# Patient Record
Sex: FEMALE | Race: WHITE | Employment: FULL TIME | URBAN - METROPOLITAN AREA
[De-identification: names, ages, dates, MRNs, and addresses within clinical notes are randomized per-mention and may not be internally consistent; named-entity substitution may affect disease eponyms.]

---

## 2017-08-28 ENCOUNTER — OFFICE VISIT (OUTPATIENT)
Dept: FAMILY MEDICINE CLINIC | Age: 32
End: 2017-08-28
Payer: COMMERCIAL

## 2017-08-28 VITALS
BODY MASS INDEX: 38.62 KG/M2 | WEIGHT: 254 LBS | DIASTOLIC BLOOD PRESSURE: 80 MMHG | HEART RATE: 84 BPM | SYSTOLIC BLOOD PRESSURE: 122 MMHG | RESPIRATION RATE: 16 BRPM | OXYGEN SATURATION: 97 %

## 2017-08-28 DIAGNOSIS — J02.9 SORE THROAT: ICD-10-CM

## 2017-08-28 DIAGNOSIS — Z00.00 ROUTINE ADULT HEALTH MAINTENANCE: ICD-10-CM

## 2017-08-28 DIAGNOSIS — R53.83 FATIGUE, UNSPECIFIED TYPE: Primary | ICD-10-CM

## 2017-08-28 PROCEDURE — 87880 STREP A ASSAY W/OPTIC: CPT | Performed by: FAMILY MEDICINE

## 2017-08-28 PROCEDURE — 99213 OFFICE O/P EST LOW 20 MIN: CPT | Performed by: FAMILY MEDICINE

## 2017-08-28 RX ORDER — EPINASTINE HCL 0.05 %
DROPS OPHTHALMIC (EYE)
COMMUNITY
Start: 2017-08-22 | End: 2017-08-28 | Stop reason: ALTCHOICE

## 2017-08-28 ASSESSMENT — ENCOUNTER SYMPTOMS
SINUS PRESSURE: 0
SORE THROAT: 1
COUGH: 1
SHORTNESS OF BREATH: 1
WHEEZING: 1
RHINORRHEA: 1
GASTROINTESTINAL NEGATIVE: 1

## 2017-08-29 LAB
ALBUMIN SERPL-MCNC: 4.2 G/DL
ALP BLD-CCNC: 70 U/L
ALT SERPL-CCNC: 13 U/L
ANION GAP SERPL CALCULATED.3IONS-SCNC: 9 MMOL/L
AST SERPL-CCNC: 17 U/L
BASOPHILS ABSOLUTE: 0 /ΜL
BASOPHILS RELATIVE PERCENT: 0.5 %
BILIRUB SERPL-MCNC: 0.5 MG/DL (ref 0.1–1.4)
BUN BLDV-MCNC: 11 MG/DL
CALCIUM SERPL-MCNC: 9.4 MG/DL
CHLORIDE BLD-SCNC: 105 MMOL/L
CHOLESTEROL, TOTAL: 204 MG/DL
CHOLESTEROL/HDL RATIO: 4.1
CO2: 24 MMOL/L
CREAT SERPL-MCNC: 0.96 MG/DL
EOSINOPHILS ABSOLUTE: 0.1 /ΜL
EOSINOPHILS RELATIVE PERCENT: 1.9 %
GFR CALCULATED: >60
GLUCOSE BLD-MCNC: 83 MG/DL
HCT VFR BLD CALC: 40.5 % (ref 36–46)
HDLC SERPL-MCNC: 50 MG/DL (ref 35–70)
HEMOGLOBIN: 13.7 G/DL (ref 12–16)
LDL CHOLESTEROL CALCULATED: 142 MG/DL (ref 0–160)
LYMPHOCYTES ABSOLUTE: 2.3 /ΜL
LYMPHOCYTES RELATIVE PERCENT: 29.4 %
MCH RBC QN AUTO: 30.1 PG
MCHC RBC AUTO-ENTMCNC: 33.8 G/DL
MCV RBC AUTO: 89 FL
MONOCYTES ABSOLUTE: 0.5 /ΜL
MONOCYTES RELATIVE PERCENT: 6.8 %
NEUTROPHILS ABSOLUTE: 4.8 /ΜL
NEUTROPHILS RELATIVE PERCENT: 61.4 %
PDW BLD-RTO: 13.6 %
PLATELET # BLD: 212 K/ΜL
PMV BLD AUTO: 9.9 FL
POTASSIUM SERPL-SCNC: 3.6 MMOL/L
RBC # BLD: 4.55 10^6/ΜL
S PYO AG THROAT QL: NORMAL
SODIUM BLD-SCNC: 138 MMOL/L
TOTAL PROTEIN: 7.1
TRIGL SERPL-MCNC: 59 MG/DL
TSH SERPL DL<=0.05 MIU/L-ACNC: 2.07 UIU/ML
VLDLC SERPL CALC-MCNC: 12 MG/DL
WBC # BLD: 7.9 10^3/ML

## 2017-08-30 DIAGNOSIS — Z00.00 ROUTINE ADULT HEALTH MAINTENANCE: ICD-10-CM

## 2017-08-30 DIAGNOSIS — R53.83 FATIGUE, UNSPECIFIED TYPE: ICD-10-CM

## 2017-08-31 DIAGNOSIS — R53.83 FATIGUE, UNSPECIFIED TYPE: ICD-10-CM

## 2017-08-31 DIAGNOSIS — J02.9 SORE THROAT: ICD-10-CM

## 2017-11-17 ENCOUNTER — HOSPITAL ENCOUNTER (OUTPATIENT)
Age: 32
Setting detail: SPECIMEN
Discharge: HOME | End: 2017-11-17
Payer: COMMERCIAL

## 2017-11-17 ENCOUNTER — OFFICE VISIT (OUTPATIENT)
Dept: FAMILY MEDICINE CLINIC | Age: 32
End: 2017-11-17
Payer: COMMERCIAL

## 2017-11-17 VITALS
BODY MASS INDEX: 38.92 KG/M2 | WEIGHT: 256 LBS | SYSTOLIC BLOOD PRESSURE: 120 MMHG | OXYGEN SATURATION: 98 % | RESPIRATION RATE: 16 BRPM | HEART RATE: 125 BPM | DIASTOLIC BLOOD PRESSURE: 80 MMHG

## 2017-11-17 DIAGNOSIS — N39.0 URINARY TRACT INFECTION WITHOUT HEMATURIA, SITE UNSPECIFIED: Primary | ICD-10-CM

## 2017-11-17 DIAGNOSIS — N30.01 ACUTE CYSTITIS WITH HEMATURIA: ICD-10-CM

## 2017-11-17 LAB
BILIRUBIN, POC: ABNORMAL
BLOOD URINE, POC: ABNORMAL
CLARITY, POC: ABNORMAL
COLOR, POC: ABNORMAL
GLUCOSE URINE, POC: ABNORMAL
KETONES, POC: 15
LEUKOCYTE EST, POC: ABNORMAL
NITRITE, POC: ABNORMAL
PH, POC: 6
PROTEIN, POC: 30
SPECIFIC GRAVITY, POC: 1.01
UROBILINOGEN, POC: 0.2

## 2017-11-17 PROCEDURE — 99213 OFFICE O/P EST LOW 20 MIN: CPT | Performed by: FAMILY MEDICINE

## 2017-11-17 PROCEDURE — 81002 URINALYSIS NONAUTO W/O SCOPE: CPT | Performed by: FAMILY MEDICINE

## 2017-11-17 RX ORDER — TOPIRAMATE 100 MG/1
CAPSULE, EXTENDED RELEASE ORAL
Refills: 0 | COMMUNITY
Start: 2017-10-25 | End: 2019-09-04 | Stop reason: SDUPTHER

## 2017-11-17 RX ORDER — CIPROFLOXACIN 250 MG/1
250 TABLET, FILM COATED ORAL 2 TIMES DAILY
Qty: 14 TABLET | Refills: 0 | Status: SHIPPED | OUTPATIENT
Start: 2017-11-17 | End: 2017-11-24

## 2017-11-17 ASSESSMENT — PATIENT HEALTH QUESTIONNAIRE - PHQ9
2. FEELING DOWN, DEPRESSED OR HOPELESS: 0
SUM OF ALL RESPONSES TO PHQ9 QUESTIONS 1 & 2: 0
SUM OF ALL RESPONSES TO PHQ QUESTIONS 1-9: 0
1. LITTLE INTEREST OR PLEASURE IN DOING THINGS: 0

## 2017-11-17 ASSESSMENT — ENCOUNTER SYMPTOMS: GASTROINTESTINAL NEGATIVE: 1

## 2017-11-17 NOTE — PROGRESS NOTES
Subjective:      Patient ID: Carmen Mojica is a 27 y.o. female. HPI  Dysuria,  Cramping,   For 2 days,  And frequency,  No fever,     Review of Systems   Gastrointestinal: Negative. Genitourinary: Positive for dysuria and frequency. Negative for menstrual problem (lmp today). Objective:   Physical Exam   Constitutional: She is oriented to person, place, and time. She appears well-developed and well-nourished. No distress. Abdominal: Soft. Bowel sounds are normal. She exhibits no distension. There is no tenderness. There is no rebound and no guarding. No cva tenderness   Musculoskeletal: She exhibits no edema. Neurological: She is alert and oriented to person, place, and time. Skin: Skin is warm and dry. No rash noted. She is not diaphoretic. No erythema. Psychiatric: She has a normal mood and affect.  Her behavior is normal. Thought content normal.   urine infected    Assessment:      uti      Plan:      See orders  See prn

## 2017-11-19 LAB
CULTURE: ABNORMAL
CULTURE: ABNORMAL
Lab: ABNORMAL
ORGANISM: ABNORMAL
SPECIMEN DESCRIPTION: ABNORMAL
STATUS: ABNORMAL

## 2018-08-02 ENCOUNTER — HOSPITAL ENCOUNTER (OUTPATIENT)
Age: 33
Discharge: HOME | End: 2018-08-04
Payer: COMMERCIAL

## 2018-08-02 ENCOUNTER — OFFICE VISIT (OUTPATIENT)
Dept: FAMILY MEDICINE CLINIC | Age: 33
End: 2018-08-02
Payer: COMMERCIAL

## 2018-08-02 ENCOUNTER — HOSPITAL ENCOUNTER (OUTPATIENT)
Dept: GENERAL RADIOLOGY | Age: 33
Discharge: HOME | End: 2018-08-04
Payer: COMMERCIAL

## 2018-08-02 VITALS
WEIGHT: 251 LBS | OXYGEN SATURATION: 98 % | HEART RATE: 90 BPM | BODY MASS INDEX: 38.04 KG/M2 | RESPIRATION RATE: 16 BRPM | HEIGHT: 68 IN | SYSTOLIC BLOOD PRESSURE: 124 MMHG | DIASTOLIC BLOOD PRESSURE: 80 MMHG

## 2018-08-02 DIAGNOSIS — M25.511 RIGHT SHOULDER PAIN, UNSPECIFIED CHRONICITY: ICD-10-CM

## 2018-08-02 DIAGNOSIS — M75.101 ROTATOR CUFF SYNDROME, RIGHT: Primary | ICD-10-CM

## 2018-08-02 DIAGNOSIS — M75.101 ROTATOR CUFF SYNDROME, RIGHT: ICD-10-CM

## 2018-08-02 PROCEDURE — 99213 OFFICE O/P EST LOW 20 MIN: CPT | Performed by: FAMILY MEDICINE

## 2018-08-02 PROCEDURE — 73030 X-RAY EXAM OF SHOULDER: CPT

## 2018-08-02 NOTE — PROGRESS NOTES
Subjective:      Patient ID: Ravinder Paige is a 27 y.o. female. HPI     Presents today for pain in the right shoulder    Onset was several months ago. There was no inciting injury. The pain continues to get worse. She has tried some icy hot and OTC pain relievers but nothing helps much. She works for [employer] and does repetitive motions of her hands. Review of Systems   Except as noted in HPI, ROS negative    Objective:   Physical Exam   Constitutional: She appears well-developed and well-nourished. No distress. Musculoskeletal: She exhibits tenderness (anterior right shoulder joint). Positive empty can test. Pain with internal rotation of the shoulder   Vitals reviewed. Assessment:      1. Rotator cuff syndrome, right    2. Right shoulder pain, unspecified chronicity          Plan:      Xray, shoulder exercises x 3-4 weeks.    If no improvement plan for MRI

## 2018-08-02 NOTE — PROGRESS NOTES
Visit Information    Have you changed or started any medications since your last visit including any over-the-counter medicines, vitamins, or herbal medicines? no   Are you having any side effects from any of your medications? -  no  Have you stopped taking any of your medications? Is so, why? -  no    Have you seen any other physician or provider since your last visit? Yes - Records Obtained  Have you had any other diagnostic tests since your last visit? No  Have you been seen in the emergency room and/or had an admission to a hospital since we last saw you? No  Have you had your routine dental cleaning in the past 6 months? yes -     Have you activated your [employer] account? If not, what are your barriers?  Yes     Patient Care Team:  Sage Wilson DO as PCP - Aby Aparicio MD as Consulting Physician (Neurology)    Medical History Review  Past Medical, Family, and Social History reviewed and does not contribute to the patient presenting condition    Health Maintenance   Topic Date Due    HIV screen  09/06/1998    DTaP/Tdap/Td vaccine (1 - Tdap) 09/06/2002    Cervical cancer screen  09/06/2004    Flu vaccine (1) 09/01/2018

## 2018-08-30 ENCOUNTER — TELEPHONE (OUTPATIENT)
Dept: FAMILY MEDICINE CLINIC | Age: 33
End: 2018-08-30

## 2018-08-30 DIAGNOSIS — M75.101 ROTATOR CUFF SYNDROME OF RIGHT SHOULDER: Primary | ICD-10-CM

## 2018-08-30 NOTE — TELEPHONE ENCOUNTER
Patient called c/o shoulder pain. She stated she was seen recently for this issue and Dr Jorge Cabrera gave her some exercises to do. These exercises are not working and she was told to contact the office again. Please advise patient what her next step of action should be.

## 2018-08-31 NOTE — TELEPHONE ENCOUNTER
I would suggest ibuprofen 600mg Q6hrs over the weekend and notify Dr. Trish Barajas early next week of progress.

## 2018-09-20 ENCOUNTER — OFFICE VISIT (OUTPATIENT)
Dept: ORTHOPEDIC SURGERY | Age: 33
End: 2018-09-20
Payer: COMMERCIAL

## 2018-09-20 VITALS
SYSTOLIC BLOOD PRESSURE: 130 MMHG | BODY MASS INDEX: 37.89 KG/M2 | HEIGHT: 68 IN | HEART RATE: 81 BPM | WEIGHT: 250 LBS | DIASTOLIC BLOOD PRESSURE: 91 MMHG

## 2018-09-20 DIAGNOSIS — M75.21 RIGHT BICIPITAL TENOSYNOVITIS: Primary | ICD-10-CM

## 2018-09-20 PROCEDURE — 99203 OFFICE O/P NEW LOW 30 MIN: CPT | Performed by: ORTHOPAEDIC SURGERY

## 2018-09-20 RX ORDER — DICLOFENAC SODIUM 75 MG/1
75 TABLET, DELAYED RELEASE ORAL 2 TIMES DAILY WITH MEALS
Qty: 28 TABLET | Refills: 0 | Status: SHIPPED | OUTPATIENT
Start: 2018-09-20 | End: 2019-09-04 | Stop reason: ALTCHOICE

## 2018-09-20 NOTE — PROGRESS NOTES
Orthopedic Shoulder Encounter Note     Chief complaint: Right shoulder pain    Trauma:  No; Date of Injury: Not applicable    HPI: Riri Zapien is a 26 y.o. old right-hand dominant female who presents for evaluation of right shoulder pain. She states that it began about a year ago. There was not a specific injury associated. It is gone worse over time. She describes her pain as being present typically with activity but occasionally can be present with or without activity. Pain is primarily localized to the superior aspect of the shoulder but radiates distally across the lateral deltoid. She has significant pain reaching backward especially to look her bra and she states that late flatbed and attempting to lift that arm causes pain as well. She denies having any associated stiffness, weakness, or radicular symptoms including numbness/tingling. Initially she was seen by her Marlin Eisenbergs" who put her through physical therapy at work without any relief. Previous treatment:    NSAIDs: Ibuprofen    Physical Therapy:  Yes    Injections: None    Surgeries: None    Review of Systems:     Constitution: no fever or chills   Pain level: 7/10  Musculoskeletal: As noted in the HPI   Neurologic: no neurologic symptoms    Past Medical History  Deisi  has no past medical history on file. Past Surgical History  Deisi  has no past surgical history on file. Current Medications  Current Outpatient Prescriptions   Medication Sig Dispense Refill    diclofenac (VOLTAREN) 75 MG EC tablet Take 1 tablet by mouth 2 times daily (with meals) for 14 days 28 tablet 0    TROKENDI  MG CP24   0     No current facility-administered medications for this visit. Allergies  Allergies have been reviewed. Deisi has No Known Allergies. Social History  Deisi  reports that she has never smoked. She has never used smokeless tobacco. She reports that she drinks alcohol. She reports that she does not use drugs.     Family History  Deisi's family history is not on file.      Physical Exam:     BP (!) 130/91   Pulse 81   Ht 5' 8\" (1.727 m)   Wt 250 lb (113.4 kg)   BMI 38.01 kg/m²    General Appearance: alert, well appearing, and in no distress  Mental Status: alert, oriented to person, place, and time  Gait: normal    Shoulder:    Skin: warm and dry, no rash or erythema; no swelling or obvious muscular atrophy  Vasculature: 2+ radial pulses bilaterally  Neuro: Sensation grossly intact to light touch diffusely  Tenderness: Tender to palpation over the biceps tendon in the bicipital groove of the right shoulder    ROM: (Degrees)    Right   A P   Left   A P    Elevation  150 160   Elevation  155   Abduction  170 170   Abduction  170    ER   80 85   ER   80   IR   T12    IR   T12   90 abd/ER      90 abd/ER     90 abd/IR      90 abd/IR     Crepitation  No    Crepitation No  Dyskenesia  No    Dyskenesia No      Muscle strength:    Right       Left    Deltoid   5    Deltoid   5  Supraspinatus  5    Supraspinatus  5  ER   5    ER   5  IR   5    IR   5    Special tests    Right   Rotator Cuff    Left    n   Painful arc    n   n   Pain with ER    n    n   Neer's     n    n   Hawkin's    n    n   Drop Arm    n  n   Lift off/Belly Press   n  n   ER Lag    n          AC Joint  n   AC tenderness   n  n   Cross-chest adduction  n       Labrum/biceps    y (equivocal)  Henderson's    n   n   Biceps sheer    n      n   Speed's/Yergason's   n    y   Tenderness Biceps Groove  n    n   Matty's    n         Instability  n   Ant Apprehension   n    n   Post Apprehension   n    n   Ant Load shift    n    n   Post Load shift   n   n   Sulcus     n  n   Generalized Laxity   n  n   Relocation test   n  n   Crank test     n  n   Adilson-superior escape  n       Imaging:  Xrays: 3 views of the right shoulder obtained on 8/2/18 were independently reviewed  Indications: Right shoulder pain  Findings: Normal glenohumeral and acromioclavicular joint spaces with type I acromion. No obvious fracture, dislocation, or subluxation. Impression: Normal right shoulder radiograph    Impression/Plan:     Angel Huerta is a 26 y.o. old female with right shoulder pain appears to be due to rotator cuff tendinitis versus biceps tenosynovitis. Due to the chronicity of her symptoms and the fact that she has undergone some therapy and use of NSAIDs without any pain relief I recommend proceeding at this time with an MR arthrogram of the shoulder. We will facilitate her getting this study completed and I'll have her follow-up afterwards to review and discuss the results. In the meantime she was placed on a 2 week course of Voltaren 75 mg to be taken twice a day with meals.         NA = Not assessed  RTC = Rotator cuff  RCT = Rotator cuff tear  ER = External rotation  IR = Internal rotation  AC = Acromioclavicular  GH = Glenohumeral  n = No  y = Yes

## 2018-09-20 NOTE — LETTER
9/20/2018    DO Debby Ariasrika [address]  [address], [facility]. [address] [place] [id number]    RE: Ronnell Llamas    Dear Dr. Jorge Cabrera,    Thank you for allowing me to participate in the care of Ms. Mora. I had the opportunity to evaluate the patient on 9/20/2018. Attached you will find my evaluation and recommendations. Thanks again for the confidence you have expressed in me by allowing my participation in the care of your patient. I will keep you apprised of further developments in the patients treatment course as it progresses. If I can be of further assistance in any fashion, please feel free to contact me at your convenience.     Sincerely,        Jasper Cox  Shoulder and Elbow Surgery

## 2018-09-21 DIAGNOSIS — G89.29 CHRONIC RIGHT SHOULDER PAIN: Primary | ICD-10-CM

## 2018-09-21 DIAGNOSIS — M25.511 CHRONIC RIGHT SHOULDER PAIN: Primary | ICD-10-CM

## 2018-11-28 ENCOUNTER — TELEPHONE (OUTPATIENT)
Dept: FAMILY MEDICINE CLINIC | Age: 33
End: 2018-11-28

## 2018-11-28 RX ORDER — AZITHROMYCIN 250 MG/1
TABLET, FILM COATED ORAL
Qty: 1 PACKET | Refills: 0 | Status: SHIPPED | OUTPATIENT
Start: 2018-11-28 | End: 2018-12-08

## 2019-02-19 ENCOUNTER — TELEPHONE (OUTPATIENT)
Dept: FAMILY MEDICINE CLINIC | Age: 34
End: 2019-02-19

## 2019-09-04 ENCOUNTER — OFFICE VISIT (OUTPATIENT)
Dept: NEUROLOGY | Age: 34
End: 2019-09-04
Payer: COMMERCIAL

## 2019-09-04 VITALS
HEIGHT: 68 IN | WEIGHT: 277.4 LBS | BODY MASS INDEX: 42.04 KG/M2 | HEART RATE: 80 BPM | DIASTOLIC BLOOD PRESSURE: 91 MMHG | SYSTOLIC BLOOD PRESSURE: 127 MMHG

## 2019-09-04 DIAGNOSIS — G43.711 CHRONIC MIGRAINE WITHOUT AURA, WITH INTRACTABLE MIGRAINE, SO STATED, WITH STATUS MIGRAINOSUS: Primary | ICD-10-CM

## 2019-09-04 PROCEDURE — 99204 OFFICE O/P NEW MOD 45 MIN: CPT | Performed by: PSYCHIATRY & NEUROLOGY

## 2019-09-04 RX ORDER — TOPIRAMATE 100 MG/1
100 CAPSULE, EXTENDED RELEASE ORAL NIGHTLY
Qty: 90 CAPSULE | Refills: 2 | Status: SHIPPED | OUTPATIENT
Start: 2019-09-04 | End: 2020-06-16

## 2019-09-04 NOTE — PROGRESS NOTES
involuntary movements or tremors     Reflex function Intact 2+ DTR and symmetric. Negative Babinski     Gait                  Normal station and gait       Lab Results   Component Value Date    LDLCALC 142 08/29/2017     No components found for: CHLPL  Lab Results   Component Value Date    TRIG 59 08/29/2017     Lab Results   Component Value Date    HDL 50 08/29/2017     Lab Results   Component Value Date    LDLCALC 142 08/29/2017     No results found for: LABVLDL  No results found for: LABA1C  No results found for: EAG  No results found for: DEUBEAHO71   Neurological work up:  CT head  CTA head and neck  MRI brain   2 D echo     All of patient's labs were personally reviewed. All the imaging studies were personally reviewed and discussed with the patient. Assessment Recommendations:  Chronic migraine without aura, intractable, with status migrainosus: Previously patient has responded well to Trokendi  mg p.o. daily. I will reinitiate the patient is a medication. Medication side effects were discussed and questions were answered. Right upper extremity tremor: Previously the tremors have responded to topiramate. Upon readmission topiramate, I anticipate the tremors improved. If need be, the dose can further be increased as she tolerates    Spasmodic torticollis: Patient previously had been getting Botox injections. She will be scheduled to get repeat injections in the next 2 weeks pending prior authorization from insurance. Follow-up with me in next 2 weeks for Botox injection. Tangela Santana, DO I would like to thank you for the consult. Please do not hesitate if you have any questions about the patient care.      Sindy Mckay MD  Neurology       This note is created with the assistance of a speech-recognition program. While intending to generate a document that actually reflects the content of the visit, the document can still have some errors including those of syntax and sound a- like

## 2019-09-11 ENCOUNTER — HOSPITAL ENCOUNTER (OUTPATIENT)
Dept: MRI IMAGING | Age: 34
Discharge: HOME | End: 2019-09-13
Payer: COMMERCIAL

## 2019-09-11 DIAGNOSIS — G43.711 CHRONIC MIGRAINE WITHOUT AURA, WITH INTRACTABLE MIGRAINE, SO STATED, WITH STATUS MIGRAINOSUS: ICD-10-CM

## 2019-09-11 PROCEDURE — 6360000004 HC RX CONTRAST MEDICATION: Performed by: PSYCHIATRY & NEUROLOGY

## 2019-09-11 PROCEDURE — 70553 MRI BRAIN STEM W/O & W/DYE: CPT

## 2019-09-11 PROCEDURE — A9579 GAD-BASE MR CONTRAST NOS,1ML: HCPCS | Performed by: PSYCHIATRY & NEUROLOGY

## 2019-09-11 RX ADMIN — GADOTERIDOL 20 ML: 279.3 INJECTION, SOLUTION INTRAVENOUS at 10:35

## 2019-10-01 ENCOUNTER — TELEPHONE (OUTPATIENT)
Dept: NEUROLOGY | Age: 34
End: 2019-10-01

## 2020-06-16 RX ORDER — TOPIRAMATE 100 MG/1
CAPSULE, EXTENDED RELEASE ORAL
Qty: 90 CAPSULE | Refills: 0 | Status: SHIPPED | OUTPATIENT
Start: 2020-06-16 | End: 2020-11-12 | Stop reason: SDUPTHER

## 2020-06-16 NOTE — TELEPHONE ENCOUNTER
Pharmacy requesting refill of Trokendi.       Medication active on med list yes      Date of last fill: 9/4/19  verified on 6/16/20 verified by [facility] [employer] LPN      Date of last appointment 9/4/19    Next Visit Date:  Visit date not found

## 2020-09-08 ENCOUNTER — OFFICE VISIT (OUTPATIENT)
Dept: FAMILY MEDICINE CLINIC | Age: 35
End: 2020-09-08
Payer: COMMERCIAL

## 2020-09-08 ENCOUNTER — HOSPITAL ENCOUNTER (OUTPATIENT)
Age: 35
Setting detail: SPECIMEN
Discharge: HOME | End: 2020-09-08
Payer: COMMERCIAL

## 2020-09-08 VITALS
WEIGHT: 265 LBS | SYSTOLIC BLOOD PRESSURE: 110 MMHG | HEART RATE: 89 BPM | DIASTOLIC BLOOD PRESSURE: 70 MMHG | RESPIRATION RATE: 18 BRPM | OXYGEN SATURATION: 98 % | HEIGHT: 68 IN | TEMPERATURE: 97.8 F | BODY MASS INDEX: 40.16 KG/M2

## 2020-09-08 PROBLEM — N13.30 HYDRONEPHROSIS: Status: ACTIVE | Noted: 2019-01-23

## 2020-09-08 PROBLEM — N13.5 URETERAL STRICTURE, LEFT: Status: ACTIVE | Noted: 2019-04-24

## 2020-09-08 LAB
ABSOLUTE EOS #: 0.18 K/UL (ref 0–0.44)
ABSOLUTE IMMATURE GRANULOCYTE: 0.03 K/UL (ref 0–0.3)
ABSOLUTE LYMPH #: 2.26 K/UL (ref 1.1–3.7)
ABSOLUTE MONO #: 0.49 K/UL (ref 0.1–1.2)
ALBUMIN SERPL-MCNC: 4.2 G/DL (ref 3.5–5.2)
ALT SERPL-CCNC: 21 U/L (ref 5–33)
ANION GAP SERPL CALCULATED.3IONS-SCNC: 16 MMOL/L (ref 9–17)
AST SERPL-CCNC: 24 U/L
BASOPHILS # BLD: 0 % (ref 0–2)
BASOPHILS ABSOLUTE: 0.03 K/UL (ref 0–0.2)
BUN BLDV-MCNC: 8 MG/DL (ref 6–20)
BUN/CREAT BLD: NORMAL (ref 9–20)
CALCIUM SERPL-MCNC: 9.6 MG/DL (ref 8.6–10.4)
CHLORIDE BLD-SCNC: 103 MMOL/L (ref 98–107)
CHOLESTEROL/HDL RATIO: 3.8
CHOLESTEROL: 204 MG/DL
CO2: 20 MMOL/L (ref 20–31)
CREAT SERPL-MCNC: 0.77 MG/DL (ref 0.5–0.9)
DIFFERENTIAL TYPE: NORMAL
EOSINOPHILS RELATIVE PERCENT: 2 % (ref 1–4)
GFR AFRICAN AMERICAN: >60 ML/MIN
GFR NON-AFRICAN AMERICAN: >60 ML/MIN
GFR SERPL CREATININE-BSD FRML MDRD: NORMAL ML/MIN/{1.73_M2}
GFR SERPL CREATININE-BSD FRML MDRD: NORMAL ML/MIN/{1.73_M2}
GLUCOSE BLD-MCNC: 78 MG/DL (ref 70–99)
HCT VFR BLD CALC: 43.4 % (ref 36.3–47.1)
HDLC SERPL-MCNC: 54 MG/DL
HEMOGLOBIN: 13.7 G/DL (ref 11.9–15.1)
HIV AG/AB: NONREACTIVE
IMMATURE GRANULOCYTES: 0 %
LDL CHOLESTEROL: 133 MG/DL (ref 0–130)
LYMPHOCYTES # BLD: 28 % (ref 24–43)
MAGNESIUM: 2.4 MG/DL (ref 1.6–2.6)
MCH RBC QN AUTO: 29.3 PG (ref 25.2–33.5)
MCHC RBC AUTO-ENTMCNC: 31.6 G/DL (ref 28.4–34.8)
MCV RBC AUTO: 92.7 FL (ref 82.6–102.9)
MONOCYTES # BLD: 6 % (ref 3–12)
NRBC AUTOMATED: 0 PER 100 WBC
PDW BLD-RTO: 13.2 % (ref 11.8–14.4)
PHOSPHORUS: 3.6 MG/DL (ref 2.6–4.5)
PLATELET # BLD: 277 K/UL (ref 138–453)
PLATELET ESTIMATE: NORMAL
PMV BLD AUTO: 11.8 FL (ref 8.1–13.5)
POTASSIUM SERPL-SCNC: 4 MMOL/L (ref 3.7–5.3)
RBC # BLD: 4.68 M/UL (ref 3.95–5.11)
RBC # BLD: NORMAL 10*6/UL
SEG NEUTROPHILS: 64 % (ref 36–65)
SEGMENTED NEUTROPHILS ABSOLUTE COUNT: 5.19 K/UL (ref 1.5–8.1)
SODIUM BLD-SCNC: 139 MMOL/L (ref 135–144)
TRIGL SERPL-MCNC: 83 MG/DL
TSH SERPL DL<=0.05 MIU/L-ACNC: 3.22 MIU/L (ref 0.3–5)
VLDLC SERPL CALC-MCNC: ABNORMAL MG/DL (ref 1–30)
WBC # BLD: 8.2 K/UL (ref 3.5–11.3)
WBC # BLD: NORMAL 10*3/UL

## 2020-09-08 PROCEDURE — 90471 IMMUNIZATION ADMIN: CPT | Performed by: FAMILY MEDICINE

## 2020-09-08 PROCEDURE — 90715 TDAP VACCINE 7 YRS/> IM: CPT | Performed by: FAMILY MEDICINE

## 2020-09-08 PROCEDURE — G8417 CALC BMI ABV UP PARAM F/U: HCPCS | Performed by: FAMILY MEDICINE

## 2020-09-08 PROCEDURE — 99395 PREV VISIT EST AGE 18-39: CPT | Performed by: FAMILY MEDICINE

## 2020-09-08 ASSESSMENT — PATIENT HEALTH QUESTIONNAIRE - PHQ9
2. FEELING DOWN, DEPRESSED OR HOPELESS: 1
SUM OF ALL RESPONSES TO PHQ QUESTIONS 1-9: 2
1. LITTLE INTEREST OR PLEASURE IN DOING THINGS: 1
SUM OF ALL RESPONSES TO PHQ9 QUESTIONS 1 & 2: 2
SUM OF ALL RESPONSES TO PHQ QUESTIONS 1-9: 2

## 2020-09-08 NOTE — PATIENT INSTRUCTIONS
Learning About Obesity  What is obesity? Obesity means having a body mass index (BMI) of 30 or above. BMI is a number that is calculated from your weight and your height. How do you know if your weight is in the obesity range? To know if your weight is in the obesity range, your doctor looks at your body mass index (BMI) and waist size. BMI is a number that is calculated from your weight and your height. To figure out your BMI for yourself, you can use an online tool, such as http://www.olson.com/ on the [employer] of [employer]. If your BMI is 30.0 or higher, it falls within the obesity range. Keep in mind that BMI and waist size are only guides. They are not tools to determine your ideal body weight. What causes obesity? When you take in more calories than you burn off, you gain weight. How you eat, how active you are, and other things affect how your body uses calories and whether you gain weight. If you have family members who have too much body fat, you may have inherited a tendency to gain weight. And your family also helps form your eating and lifestyle habits, which can lead to obesity. Also, our busy lives make it harder to plan and cook healthy meals. For many of us, it's easier to reach for prepared foods, go out to eat, or go to the drive-through. But these foods are often high in saturated fat and calories. Portions are often too large. What can you do to reach a healthy weight? Focus on health, not diets. Diets are hard to stay on and don't work in the long run. It is very hard to stay with a diet that includes lots of big changes in your eating habits. Instead of a diet, focus on lifestyle changes that will improve your health and achieve the right balance of energy and calories. To lose weight, you need to burn more calories than you take in.  You can do it by eating healthy foods in reasonable amounts and becoming more active, even a little bit every day. Making small changes over time can add up to a lot. Make a plan for change. Many people have found that naming their reasons for change and staying focused on their plan can make a big difference. Work with your doctor to create a plan that is right for you. · Ask yourself: Keena Torres are my personal, most powerful reasons for wanting this change? What will my life look like when I've made the change? \"  · Set your long-term goal. Make it specific, such as \"I will lose x pounds. \"  · Break your long-term goal into smaller, short-term goals. Make these small steps specific and within your reach, things you know you can do. These steps are what keep you going from day to day. Talk with your doctor about other weight-loss options. If you have a BMI in a certain range and have not been able to lose weight with diet and exercise, medicine or surgery may be an option for you. Before your doctor will prescribe medicines or surgery, he or she will probably want you to be more active and follow your healthy eating plan for a period of time. These habits are key lifelong changes for managing your weight, with or without other medical treatment. And these changes can help you avoid weight-related health problems. How can you stay on your plan for change? Be ready. Choose to start during a time when there are few events that might trigger slip-ups, like holidays, social events, and high-stress periods. Decide on your first few steps. Most people have more success when they make small changes, one step at a time. For example, you might switch a daily candy bar to a piece of fruit, walk 10 minutes more, or add more vegetables to a meal.  Line up your support people. Make sure you're not going to be alone as you make this change. Connect with people who understand how important it is to you. Ask family members and friends for help in keeping with your plan.  And think about who could make it harder for you, and how to handle them. Try tracking. People who keep track of what they eat, feel, and do are better at losing weight. Try writing down things like:  · What and how much you eat. · How you feel before and after each meal.  · Details about each meal (like eating out or at home, eating alone, or with friends or family). · What you do to be active. Look and plan. As you track, look for patterns that you may want to change. Take note of:  · When you eat and whether you skip meals. · How often you eat out. · How many fruits and vegetables you eat. · When you eat beyond feeling full. · When and why you eat for reasons other than being hungry. When you stray from your plan, don't get upset. Figure out what made you slip up and how you can fix it. Can you take medicines or have surgery to lose weight? If you have a BMI in a certain range and have not been able to lose weight with diet and exercise, medicine or surgery may be an option for you. If you have a BMI of at least 30.0 (or a BMI of at least 27.0 and another health problem related to your weight), ask your doctor about weight-loss medicines. They work by making you feel less hungry, making you feel full more quickly, or changing how you digest fat. Medicines are used along with diet changes and more physical activity to help you make lasting changes. If you have a BMI of 40.0 or more (or a BMI of 35.0 or more and another health problem related to your weight), your doctor may talk with you about surgery. Weight-loss surgery has risks, and you will need to work with your doctor to compare the risk of having obesity with the risks of surgery. With any option you choose, you will still need to eat a healthy diet and get regular exercise. Follow-up care is a key part of your treatment and safety. Be sure to make and go to all appointments, and call your doctor if you are having problems.  It's also a good idea to know your test results and keep a list of the medicines you take. Where can you learn more? Go to https://chpepiceweb.[employer]. org and sign in to your [employer] account. Enter N111 in the [employer] box to learn more about \"Learning About Obesity. \"     If you do not have an account, please click on the \"Sign Up Now\" link. Current as of: December 11, 2019               Content Version: 12.5  © 4094-9228 [employer]wise, [employer]. Care instructions adapted under license by [facility] ([facility]). If you have questions about a medical condition or this instruction, always ask your healthcare professional. [address] any warranty or liability for your use of this information.

## 2020-09-08 NOTE — PROGRESS NOTES
Progress Note    Deisi Mora is a 38 y.o.  female who presents today alone for evaluation of   Chief Complaint   Patient presents with    New Patient           HPI:   Patient is her to [facility] today. Patient PMH TBI and intention tremor. Patient [address] eye surgery, CTS release, inguinal hernia repair, and left ureter stent. Patient fhx MGM DM type 2. Patient is a never smoker. Patient admits to occasional EtOH beverage. Patient denies illicits. Patient denies SIG E CAPS. Patient denies SI/HI. Patient denies anxiety. Patient denies specific diet. Patient denies regular aerobic activity. Patient follows with GYN. Patient denies prior abnormal PAP. Patient states she believes her last PAP was last year. PHQ-9 Total Score: 2 (9/8/2020 10:50 AM)    Patient reports migraines since TBI. Patient denies aura. Patient reports the migraine over her bilateral forehead and behind her eye bilaterally. Patient states she has associated n/v. Patient last MRI was 2019 and did not reveal any acute abnormality. Patient states she has had her flu shot already. Patient would like Tdap today. Health Maintenance Due   Topic Date Due    Varicella vaccine (1 of 2 - 2-dose childhood series) 09/06/1984    HIV screen  09/06/1998    DTaP/Tdap/Td vaccine (1 - Tdap) 09/06/2002    Cervical cancer screen  09/06/2004    Flu vaccine (1) 09/01/2020        Current Medications:     Current Outpatient Medications   Medication Sig Dispense Refill    TROKENDI  MG CP24 take 1 capsule by mouth at bedtime (Patient not taking: Reported on 9/8/2020) 90 capsule 0     No current facility-administered medications for this visit.         Allergies:   No Known Allergies     Medical History:     Past Medical History:   Diagnosis Date    Intention tremor     TBI (traumatic brain injury) ([facility] [address]        Past Surgical History:   Procedure Laterality Date    CARPAL TUNNEL RELEASE      EYE SURGERY      INGUINAL HERNIA REPAIR      URETER STENT PLACEMENT         Family History   Problem Relation Age of Onset    Diabetes type 2  Maternal Grandmother         Social History:     Social History     Socioeconomic History    Marital status:      Spouse name: Not on file    Number of children: Not on file    Years of education: Not on file    Highest education level: Not on file   Occupational History    Not on file   Social Needs    Financial resource strain: Not on file    Food insecurity     Worry: Not on file     Inability: Not on file    Transportation needs     Medical: Not on file     Non-medical: Not on file   Tobacco Use    Smoking status: Never Smoker    Smokeless tobacco: Never Used   Substance and Sexual Activity    Alcohol use: Yes    Drug use: No    Sexual activity: Not on file   Lifestyle    Physical activity     Days per week: Not on file     Minutes per session: Not on file    Stress: Not on file   Relationships    Social connections     Talks on phone: Not on file     Gets together: Not on file     Attends Uatsdin service: Not on file     Active member of club or organization: Not on file     Attends meetings of clubs or organizations: Not on file     Relationship status: Not on file    Intimate partner violence     Fear of current or ex partner: Not on file     Emotionally abused: Not on file     Physically abused: Not on file     Forced sexual activity: Not on file   Other Topics Concern    Not on file   Social History Narrative    Not on file        ROS:     Constitutional: No fevers, chills, fatigue. ENT: No nasal congestion or sore throat  Respiratory: No difficulty in breathing or cough. Cardiovascular: No chest pain, palpitations or shortness of breath  Gastrointestinal: No abdominal pain or change in bowel movements. Genitourinary: No change in urinary frequency or dysuria. Skin: No rashes or skin lesions. Neurological: No weakness. No headaches.          Last Filed Vitals:  /70   Pulse 89 Temp 97.8 °F (36.6 °C) (Temporal)   Resp 18   Ht 5' 8\" (1.727 m)   Wt 265 lb (120.2 kg)   SpO2 98%   BMI 40.29 kg/m²      Physical Examination:     GENERAL APPEARANCE: in no acute distress, well developed, well nourished. HEAD: normocephalic, atraumatic. EYES: extraocular movement intact (EOMI), pupils equal, round, reactive to light and accommodation. EARS: normal, tympanic membrane intact, clear, auditory canal clear. NOSE: nares patent, no erythema, sinuses nontender bilaterally, no rhinorrhea. ORAL CAVITY: mucosa moist, no lesions. THROAT: clear, no mass, no exudate. NECK/THYROID: neck supple, full range of motion, no thyromegaly. HEART: no murmurs, regular rate and rhythm, S1, S2 normal.   LUNGS: clear to auscultation bilaterally, no wheezes, rales, rhonchi. ABDOMEN: normal, bowel sounds present, soft, nontender, nondistended, no rebound guarding or rigidity    Recent Labs/ In Office Testing/ Radiograph review:     Hospital Outpatient Visit on 11/17/2017   Component Date Value Ref Range Status    Specimen Description 11/17/2017 . CLEAN CATCH URINE   Final    Special Requests 11/17/2017 NOT REPORTED   Final    Culture 11/17/2017 ESCHERICHIA COLI 50 to 100,000 CFU/ML*  Final    Culture 11/17/2017 [facility] [address] [place] [address], [address] [place] [address] [phone]   Final    Status 11/17/2017 FINAL 11/19/2017   Final    Organism 11/17/2017 EC   Final       No results found for this visit on 09/08/20. Assessment/Plan:     [facility] was seen today for new patient. Diagnoses and all orders for this visit:    Well adult exam    BMI 40.0-44.9, adult [facility])    Exercise counseling    Dietary counseling  -      BMI ABOVE NORMAL F/U    Multiphasic screening  -     HIV Screen; Future    Metabolic syndrome  -     ALT; Future  -     AST; Future  -     CBC Auto Differential; Future  -     Hemoglobin A1C; Future  -     Lipid Panel; Future  -     Magnesium;  Future  -     Renal Function Panel; Future  -     TSH with Reflex; Future    Migraine without aura and without status migrainosus, not intractable  -     Denisha Kelsey MD, Neurology, [facility]    Need for vaccination  -     Tdap (age 6y and older) IM ([address])    Follow up on labs. Encouraged well balanced diet. Encouraged 150 mins of aerobic activity weekly. Tdap today. Referral for neurology. She has had a neurologist since her TBI. She states neurology manages her migraines. She has failed inderal and topamax in the past. Asked her to discuss Aimovig with her neurologist. She states she had received her influenza vaccination already. All questions answered and addressed to patient satisfaction. Patient understands and agrees to the plan. The patient was evaluated and treated today based on the osteopathic principle that each person is a unit of body, mind, and spirit, the body is capable of self-regulation, self-healing, and health maintenance and that structure and function are reciprocally interrelated. Follow-up:   Return in about 1 year (around 9/8/2021) for CPE; 40 min appt. Gill Cabrera D.O.    BMI was elevated today, and weight loss plan recommended is : conventional weight loss.

## 2020-09-09 LAB
ESTIMATED AVERAGE GLUCOSE: 111 MG/DL
HBA1C MFR BLD: 5.5 % (ref 4–6)

## 2020-10-02 ENCOUNTER — TELEPHONE (OUTPATIENT)
Dept: NEUROLOGY | Age: 35
End: 2020-10-02

## 2020-10-02 NOTE — TELEPHONE ENCOUNTER
Prior auth for Trokendi XR done through [employer]. It was approved. I called [facility] and let them know that it was approved. I spoke with Tracey Dumont. I also called Deisi and let her know that it was approved.

## 2020-10-21 ENCOUNTER — OFFICE VISIT (OUTPATIENT)
Dept: NEUROLOGY | Age: 35
End: 2020-10-21
Payer: COMMERCIAL

## 2020-10-21 VITALS
TEMPERATURE: 97.4 F | DIASTOLIC BLOOD PRESSURE: 86 MMHG | WEIGHT: 269 LBS | HEART RATE: 74 BPM | SYSTOLIC BLOOD PRESSURE: 124 MMHG | HEIGHT: 68 IN | BODY MASS INDEX: 40.77 KG/M2

## 2020-10-21 PROCEDURE — 99214 OFFICE O/P EST MOD 30 MIN: CPT | Performed by: PSYCHIATRY & NEUROLOGY

## 2020-10-21 RX ORDER — TOPIRAMATE 25 MG/1
25 CAPSULE, EXTENDED RELEASE ORAL DAILY
Qty: 30 CAPSULE | Refills: 2 | Status: SHIPPED | OUTPATIENT
Start: 2020-10-21 | End: 2022-06-07
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
## 2020-11-12 RX ORDER — TOPIRAMATE 100 MG/1
CAPSULE, EXTENDED RELEASE ORAL
Qty: 30 CAPSULE | Refills: 2 | Status: SHIPPED | OUTPATIENT
Start: 2020-11-12 | End: 2021-09-20

## 2020-11-12 RX ORDER — TOPIRAMATE 100 MG/1
CAPSULE, EXTENDED RELEASE ORAL
Qty: 90 CAPSULE | Refills: 0 | OUTPATIENT
Start: 2020-11-12

## 2021-01-26 ENCOUNTER — OFFICE VISIT (OUTPATIENT)
Dept: NEUROLOGY | Age: 36
End: 2021-01-26
Payer: COMMERCIAL

## 2021-01-26 VITALS
HEIGHT: 68 IN | TEMPERATURE: 99.1 F | DIASTOLIC BLOOD PRESSURE: 81 MMHG | WEIGHT: 256 LBS | HEART RATE: 83 BPM | SYSTOLIC BLOOD PRESSURE: 126 MMHG | BODY MASS INDEX: 38.8 KG/M2

## 2021-01-26 DIAGNOSIS — R53.83 FATIGUE, UNSPECIFIED TYPE: Primary | ICD-10-CM

## 2021-01-26 PROCEDURE — 99213 OFFICE O/P EST LOW 20 MIN: CPT | Performed by: PSYCHIATRY & NEUROLOGY
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
## 2021-02-15 ENCOUNTER — HOSPITAL ENCOUNTER (OUTPATIENT)
Dept: LAB | Age: 36
Setting detail: SPECIMEN
Discharge: HOME | End: 2021-02-15
Payer: COMMERCIAL

## 2021-02-15 DIAGNOSIS — Z20.822 COVID-19 RULED OUT BY LABORATORY TESTING: Primary | ICD-10-CM

## 2021-02-15 PROCEDURE — U0003 INFECTIOUS AGENT DETECTION BY NUCLEIC ACID (DNA OR RNA); SEVERE ACUTE RESPIRATORY SYNDROME CORONAVIRUS 2 (SARS-COV-2) (CORONAVIRUS DISEASE [COVID-19]), AMPLIFIED PROBE TECHNIQUE, MAKING USE OF HIGH THROUGHPUT TECHNOLOGIES AS DESCRIBED BY CMS-2020-01-R: HCPCS

## 2021-02-15 PROCEDURE — U0005 INFEC AGEN DETEC AMPLI PROBE: HCPCS

## 2021-02-16 LAB
SARS-COV-2: NORMAL
SARS-COV-2: NOT DETECTED
SOURCE: NORMAL

## 2021-02-17 ENCOUNTER — TELEPHONE (OUTPATIENT)
Dept: PRIMARY CARE CLINIC | Age: 36
End: 2021-02-17

## 2021-03-04 ENCOUNTER — TELEPHONE (OUTPATIENT)
Dept: NEUROLOGY | Age: 36
End: 2021-03-04

## 2021-03-04 DIAGNOSIS — R51.9 HEADACHE DISORDER: ICD-10-CM

## 2021-03-04 DIAGNOSIS — R53.83 FATIGUE, UNSPECIFIED TYPE: Primary | ICD-10-CM

## 2021-03-04 NOTE — TELEPHONE ENCOUNTER
Ang Caldera from [place] [address] lab on [address] called the office this morning. She stated that the patient's insurance has denied the inlab sleep study and is asking for a new order for a home sleep study. Order will be placed.

## 2021-03-16 ENCOUNTER — HOSPITAL ENCOUNTER (OUTPATIENT)
Dept: SLEEP CENTER | Age: 36
Discharge: HOME | End: 2021-03-18
Payer: COMMERCIAL

## 2021-03-16 DIAGNOSIS — R51.9 HEADACHE DISORDER: ICD-10-CM

## 2021-03-16 DIAGNOSIS — R53.83 FATIGUE, UNSPECIFIED TYPE: ICD-10-CM

## 2021-03-16 PROCEDURE — G0399 HOME SLEEP TEST/TYPE 3 PORTA: HCPCS

## 2021-03-25 LAB — STATUS: NORMAL

## 2021-03-25 PROCEDURE — 95806 SLEEP STUDY UNATT&RESP EFFT: CPT | Performed by: PSYCHIATRY & NEUROLOGY

## 2021-06-02 ENCOUNTER — OFFICE VISIT (OUTPATIENT)
Dept: NEUROLOGY | Age: 36
End: 2021-06-02
Payer: COMMERCIAL

## 2021-06-02 VITALS
SYSTOLIC BLOOD PRESSURE: 122 MMHG | HEIGHT: 68 IN | DIASTOLIC BLOOD PRESSURE: 74 MMHG | BODY MASS INDEX: 37.74 KG/M2 | WEIGHT: 249 LBS

## 2021-06-02 DIAGNOSIS — R51.9 HEADACHE DISORDER: Primary | ICD-10-CM

## 2021-06-02 PROCEDURE — 99214 OFFICE O/P EST MOD 30 MIN: CPT | Performed by: PSYCHIATRY & NEUROLOGY

## 2021-06-02 RX ORDER — AMITRIPTYLINE HYDROCHLORIDE 25 MG/1
25 TABLET, FILM COATED ORAL NIGHTLY
Qty: 30 TABLET | Refills: 1 | Status: SHIPPED | OUTPATIENT
Start: 2021-06-02 | End: 2022-07-21
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
## 2021-07-29 ENCOUNTER — TELEMEDICINE (OUTPATIENT)
Dept: NEUROLOGY | Age: 36
End: 2021-07-29
Payer: COMMERCIAL

## 2021-07-29 DIAGNOSIS — R51.9 HEADACHE DISORDER: Primary | ICD-10-CM

## 2021-07-29 PROCEDURE — 99214 OFFICE O/P EST MOD 30 MIN: CPT | Performed by: PSYCHIATRY & NEUROLOGY
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
## 2021-09-08 ENCOUNTER — TELEPHONE (OUTPATIENT)
Dept: NEUROLOGY | Age: 36
End: 2021-09-08

## 2021-09-08 NOTE — TELEPHONE ENCOUNTER
[employer] [facility]) - [id number]  Trokendi XR 100MG er capsules       Status: PA Response - Approved    Created: September 3rd, 2021    Sent: September 8th, 2021    Open  Archive    Sent patient my chart message of approval.  Sent to [facility]

## 2021-09-08 NOTE — TELEPHONE ENCOUNTER
Started in [employer]     Pending approval    [employer] Key: [id number] - PA Case ID: [id number]  Call us at [phone]  Status  Sent to [facility]  Drug  Trokendi XR 100MG er capsules  Form

## 2021-09-17 NOTE — TELEPHONE ENCOUNTER
Pharmacy requesting a  refill of Trokendi xr 100mg.       Medication active on med list yes      Date of last prescription 11/12/2020  with 2 refills verified on 09/17/2021    verified by TAO THORNTON      Date of last appointment 07/29/2021    Next Visit Date:  12/29/2021 No

## 2021-09-20 RX ORDER — TOPIRAMATE 100 MG/1
CAPSULE, EXTENDED RELEASE ORAL
Qty: 90 CAPSULE | Refills: 2 | Status: SHIPPED | OUTPATIENT
Start: 2021-09-20 | End: 2022-06-29

## 2021-12-29 ENCOUNTER — OFFICE VISIT (OUTPATIENT)
Dept: NEUROLOGY | Age: 36
End: 2021-12-29
Payer: COMMERCIAL

## 2021-12-29 VITALS
BODY MASS INDEX: 36.83 KG/M2 | HEIGHT: 68 IN | HEART RATE: 87 BPM | DIASTOLIC BLOOD PRESSURE: 85 MMHG | TEMPERATURE: 97.3 F | SYSTOLIC BLOOD PRESSURE: 120 MMHG | WEIGHT: 243 LBS

## 2021-12-29 DIAGNOSIS — R51.9 HEADACHE DISORDER: Primary | ICD-10-CM

## 2021-12-29 PROCEDURE — 99213 OFFICE O/P EST LOW 20 MIN: CPT | Performed by: PSYCHIATRY & NEUROLOGY
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
## 2022-06-07 ENCOUNTER — OFFICE VISIT (OUTPATIENT)
Dept: FAMILY MEDICINE CLINIC | Age: 37
End: 2022-06-07
Payer: COMMERCIAL

## 2022-06-07 VITALS
DIASTOLIC BLOOD PRESSURE: 88 MMHG | OXYGEN SATURATION: 98 % | HEART RATE: 88 BPM | WEIGHT: 234 LBS | SYSTOLIC BLOOD PRESSURE: 136 MMHG | BODY MASS INDEX: 35.58 KG/M2

## 2022-06-07 DIAGNOSIS — Z71.82 EXERCISE COUNSELING: ICD-10-CM

## 2022-06-07 DIAGNOSIS — Z71.3 DIETARY COUNSELING: ICD-10-CM

## 2022-06-07 DIAGNOSIS — Z13.228 SCREENING FOR METABOLIC DISORDER: ICD-10-CM

## 2022-06-07 DIAGNOSIS — F51.01 PRIMARY INSOMNIA: ICD-10-CM

## 2022-06-07 DIAGNOSIS — Z00.00 WELL ADULT EXAM: Primary | ICD-10-CM

## 2022-06-07 PROCEDURE — G8417 CALC BMI ABV UP PARAM F/U: HCPCS | Performed by: FAMILY MEDICINE

## 2022-06-07 PROCEDURE — 99395 PREV VISIT EST AGE 18-39: CPT | Performed by: FAMILY MEDICINE

## 2022-06-07 RX ORDER — TRAZODONE HYDROCHLORIDE 50 MG/1
TABLET ORAL
Qty: 60 TABLET | Refills: 1 | Status: SHIPPED | OUTPATIENT
Start: 2022-06-07 | End: 2022-07-21 | Stop reason: SDUPTHER

## 2022-06-07 SDOH — ECONOMIC STABILITY: FOOD INSECURITY: WITHIN THE PAST 12 MONTHS, YOU WORRIED THAT YOUR FOOD WOULD RUN OUT BEFORE YOU GOT MONEY TO BUY MORE.: NEVER TRUE

## 2022-06-07 SDOH — ECONOMIC STABILITY: FOOD INSECURITY: WITHIN THE PAST 12 MONTHS, THE FOOD YOU BOUGHT JUST DIDN'T LAST AND YOU DIDN'T HAVE MONEY TO GET MORE.: NEVER TRUE

## 2022-06-07 ASSESSMENT — SOCIAL DETERMINANTS OF HEALTH (SDOH): HOW HARD IS IT FOR YOU TO PAY FOR THE VERY BASICS LIKE FOOD, HOUSING, MEDICAL CARE, AND HEATING?: NOT HARD AT ALL

## 2022-06-07 ASSESSMENT — PATIENT HEALTH QUESTIONNAIRE - PHQ9
1. LITTLE INTEREST OR PLEASURE IN DOING THINGS: 0
SUM OF ALL RESPONSES TO PHQ QUESTIONS 1-9: 0
2. FEELING DOWN, DEPRESSED OR HOPELESS: 0
SUM OF ALL RESPONSES TO PHQ QUESTIONS 1-9: 0
SUM OF ALL RESPONSES TO PHQ QUESTIONS 1-9: 0
SUM OF ALL RESPONSES TO PHQ9 QUESTIONS 1 & 2: 0
SUM OF ALL RESPONSES TO PHQ QUESTIONS 1-9: 0

## 2022-06-07 NOTE — PATIENT INSTRUCTIONS
Learning About Obesity  What is obesity? Obesity means having an unhealthy amount of body fat. This puts your health in danger. It can lead to other health problems, such as type 2 diabetes and highblood pressure. How do you know if your weight is in the obesity range? To know if your weight is in the obesity range, your doctor looks at your bodymass index (BMI) and waist size. BMI is a number that is calculated from your weight and your height. To figure out your BMI for yourself, you can use an online tool, such as http://www.Applyful/ on Sqwiggle. If your BMI is 30.0 or higher, it falls within the obesity range. Keep in mind that BMI and waist size are only guides. They are not tools to determine yourideal body weight. What causes obesity? When you take in more calories than you burn off, you gain weight. How you eat, how active you are, and other things affect how your body uses calories andwhether you gain weight. If you have family members who have too much body fat, you may have inherited a tendency to gain weight. And your family also helps form your eating andlifestyle habits, which can lead to obesity. Also, our busy lives make it harder to plan and cook healthy meals. For many of us, it's easier to reach for prepared foods, go out to eat, or go to the drive-through. But these foods are often high in saturated fat and calories. Portions are often too large. What can you do to reach a healthy weight? Focus on health, not diets. Diets are hard to stay on and don't work in the long run. It is very hard tostay with a diet that includes lots of big changes in your eating habits. Instead of a diet, focus on lifestyle changes that will improve your health and achieve the right balance of energy and calories. To lose weight, you need to burn more calories than you take in.  You can do it by eating healthy foods in reasonable amounts and becoming more active, even a little bit every day. Making small changes over time can add up to a lot. Make a plan for change. Many people have found that naming their reasons for change and staying focused on their plan can make a big difference. Work with your doctor tocreate a plan that is right for you.  Ask yourself: Ronn [place] are my personal, most powerful reasons for wanting this change? What will my life look like when I've made the change? \"   Set your long-term goal. Make it specific, such as \"I will lose x pounds. \"  Reginia Dunks your long-term goal into smaller, short-term goals. Make these small steps specific and within your reach, things you know you can do. These steps are what keep you going from day to day. Talk with your doctor about other weight-loss options. If you have a BMI in a certain range and have not been able to lose weight with diet and exercise, medicine or surgery may be an option for you. Before your doctor will prescribe medicines or surgery, he or she will probably want you to be more active and follow your healthy eating plan for a period of time. These habits are key lifelong changes for managing your weight, with or without other medical treatment. And these changes can help you avoid weight-relatedhealth problems. How can you stay on your plan for change? Be ready. Choose to start during a time when there are few events like holidays, socialevents, and high-stress periods. These events might trigger slip-ups. Decide on your first few steps. Most people have more success when they make small changes, one step at a time. For example, you might switch a daily candy bar to a piece of fruit, walk10 minutes more, or add more vegetables to a meal.  Line up your support people. Make sure you're not going to be alone as you make this change. Connect with people who understand how important it is to you.  Ask family members and friends for help in keeping with your plan. And think about who could make itharder for you, and how to handle them. Try tracking. People who keep track of what they eat, feel, and do are better at losingweight. Try writing down things like:   What and how much you eat.  How you feel before and after each meal.   Details about each meal (like eating out or at home, eating alone, or with friends or family).  What you do to be active. Look and plan. As you track, look for patterns that you may want to change. Take note of:   When you eat and whether you skip meals.  How often you eat out.  How many fruits and vegetables you eat.  When you eat beyond feeling full.  When and why you eat for reasons other than being hungry. When you stray from your plan, don't get upset. Figure out what made you slipup and how you can fix it. Can you take medicines or have surgery to lose weight? If you have a BMI in a certain range and have not been able to lose weight withdiet and exercise, medicine or surgery may be an option for you. If you have a BMI of at least 30.0 (or a BMI of at least 27.0 and another health problem related to your weight), ask your doctor about weight-loss medicines. They work by making you feel less hungry, making you feel full more quickly, or changing how you digest fat. Medicines are used along with dietchanges and more physical activity to help you make lasting changes. If you have a BMI of 40.0 or more (or a BMI of 35.0 or more and another health problem related to your weight), your doctor may talk with you about surgery. Weight-loss surgery has risks, and you will need to work with your doctor tocompare the risk of having obesity with the risks of surgery. With any option you choose, you will still need to eat a healthy diet and getregular exercise. Follow-up care is a key part of your treatment and safety. Be sure to make and go to all appointments, and call your doctor if you are having problems.  It's also a good idea to know your test results and keep alist of the medicines you take. Where can you learn more? Go to [email or web address] org and sign in to your My True Fit account. Enter N111 in the [facility] box to learn more about \"Learning About Obesity. \"     If you do not have an account, please click on the \"Sign Up Now\" link. Current as of: December 27, 2021               Content Version: 13.2  © 4743-8563 Healthwise, Incorporated. Care instructions adapted under license by [facility] ([facility]). If you have questions about a medical condition or this instruction, always ask your healthcare professional. [address] any warranty or liability for your use of this information.

## 2022-06-07 NOTE — PROGRESS NOTES
Progress Note    Deisi Mora is a 43 y.o.  female who presents today alone for evaluation of   Chief Complaint   Patient presents with    Insomnia     started around a month ago     Annual Exam           HPI:   Patient is here for CPE today. Patient PMH TBI and intention tremor. Patient [address] eye surgery, CTS release, inguinal hernia repair, and left ureter stent. Patient fhx MGM DM type 2. Patient is a never smoker. Patient admits to occasional EtOH beverage. Patient denies illicits. Patient denies SIG E CAPS. Patient denies SI/HI. Patient denies anxiety. Patient denies specific diet. Patient denies regular aerobic activity. Patient follows with GYN. Patient denies prior abnormal PAP. Patient states she believes her last PAP was this year. PHQ-9 Total Score: 0 (6/7/2022 10:46 AM)      Patient is here for primary insomnia today. Patient states she does have difficulty falling and staying asleep. Patient states benadryl and melatonin have not helped. Patient denies anxiety/depression. Health Maintenance Due   Topic Date Due    Varicella vaccine (1 of 2 - 2-dose childhood series) Never done    Hepatitis C screen  Never done    Cervical cancer screen  Never done    Depression Screen  09/08/2021        Current Medications:     Current Outpatient Medications   Medication Sig Dispense Refill    traZODone (DESYREL) 50 MG tablet Take 1-2 tablets one hour prior to bedtime. 60 tablet 1    TROKENDI  MG CP24 take 1 capsule by mouth at bedtime 90 capsule 2    amitriptyline (ELAVIL) 25 MG tablet Take 1 tablet by mouth nightly 30 tablet 1     No current facility-administered medications for this visit.        Allergies:   No Known Allergies     Medical History:     Past Medical History:   Diagnosis Date    Intention tremor     TBI (traumatic brain injury) ([facility] [address]        Past Surgical History:   Procedure Laterality Date    CARPAL TUNNEL RELEASE      EYE SURGERY      INGUINAL HERNIA REPAIR      URETER STENT PLACEMENT         Family History   Problem Relation Age of Onset    Diabetes type 2  Maternal Grandmother         Social History:     Social History     Socioeconomic History    Marital status:      Spouse name: Not on file    Number of children: Not on file    Years of education: Not on file    Highest education level: Not on file   Occupational History    Not on file   Tobacco Use    Smoking status: Never Smoker    Smokeless tobacco: Never Used   Vaping Use    Vaping Use: Never used   Substance and Sexual Activity    Alcohol use: Yes     Alcohol/week: 1.0 standard drink     Types: 1 Cans of beer per week    Drug use: No    Sexual activity: Not on file   Other Topics Concern    Not on file   Social History Narrative    Not on file     Social Determinants of Health     Financial Resource Strain: Low Risk     Difficulty of Paying Living Expenses: Not hard at all   Food Insecurity: No Food Insecurity    Worried About [address] in the Last Year: Never true    [facility] [address] in the Last Year: Never true   Transportation Needs:     Lack of Transportation (Medical): Not on file    Lack of Transportation (Non-Medical):  Not on file   Physical Activity:     Days of Exercise per Week: Not on file    Minutes of Exercise per Session: Not on file   Stress:     Feeling of Stress : Not on file   Social Connections:     Frequency of Communication with Friends and Family: Not on file    Frequency of Social Gatherings with Friends and Family: Not on file    Attends Mormon Services: Not on file    Active Member of Clubs or Organizations: Not on file    Attends Club or Organization Meetings: Not on file    Marital Status: Not on file   Intimate Partner Violence:     Fear of Current or Ex-Partner: Not on file    Emotionally Abused: Not on file    Physically Abused: Not on file    Sexually Abused: Not on file   Housing Stability:     Unable to Pay for Housing in the Last Year: Not on file    Number of Places Lived in the Last Year: Not on file    Unstable Housing in the Last Year: Not on file        ROS:     Constitutional: No fevers, chills, fatigue. ENT: No nasal congestion or sore throat  Respiratory: No difficulty in breathing or cough. Cardiovascular: No chest pain, palpitations or shortness of breath  Gastrointestinal: No abdominal pain or change in bowel movements. Genitourinary: No change in urinary frequency or dysuria. Skin: No rashes or skin lesions. Neurological: No weakness. No headaches. Last Filed Vitals:  /88   Pulse 88   Wt 234 lb (106.1 kg)   SpO2 98%   BMI 35.58 kg/m²      Physical Examination:     GENERAL APPEARANCE: in no acute distress, well developed, well nourished. HEAD: normocephalic, atraumatic. EYES: extraocular movement intact (EOMI), pupils equal, round, reactive to light and accommodation. EARS: normal, tympanic membrane intact, clear, auditory canal clear. NOSE: nares patent, no erythema, sinuses nontender bilaterally, no rhinorrhea. ORAL CAVITY: mucosa moist, no lesions. THROAT: clear, no mass, no exudate. NECK/THYROID: neck supple, full range of motion, no thyromegaly. HEART: no murmurs, regular rate and rhythm, S1, S2 normal.   LUNGS: clear to auscultation bilaterally, no wheezes, rales, rhonchi. ABDOMEN: normal, bowel sounds present, soft, nontender, nondistended, no rebound guarding or rigidity    Recent Labs/ In Office Testing/ Radiograph review:     Hospital Outpatient Visit on 03/16/2021   Component Date Value Ref Range Status    Status 03/16/2021 Interpreted   Final       No results found for this visit on 06/07/22.            Assessment/Plan:     Pam Buckner was seen today for insomnia and annual exam.    Diagnoses and all orders for this visit:    Well adult exam    BMI 35.0-35.9,adult    Exercise counseling    Dietary counseling  -      BMI ABOVE NORMAL F/U    Screening for metabolic disorder  - ALT; Future  -     AST; Future  -     CBC with Auto Differential; Future  -     Hemoglobin A1C; Future  -     Lipid Panel; Future  -     Magnesium; Future  -     Renal Function Panel; Future  -     TSH with Reflex; Future    Primary insomnia  -     traZODone (DESYREL) 50 MG tablet; Take 1-2 tablets one hour prior to bedtime.  -     TSH with Reflex; Future    Follow up on labs. Encouraged well balanced diet. Encouraged 150 mins of aerobic activity weekly. Rx as above. Encouraged regular follow up with her specialists. All questions answered and addressed to patient satisfaction. Patient understands and agrees to the plan. The patient was evaluated and treated today based on the osteopathic principle that each person is a unit of body, mind, and spirit, the body is capable of self-regulation, self-healing, and health maintenance and that structure and function are reciprocally interrelated. Follow-up:   Return in about 6 weeks (around 7/19/2022) for insomnia; 20 min appt. Yuval Casanova D.O.    BMI was elevated today, and weight loss plan recommended is : conventional weight loss.

## 2022-06-08 ENCOUNTER — HOSPITAL ENCOUNTER (OUTPATIENT)
Facility: CLINIC | Age: 37
Discharge: HOME | End: 2022-06-08
Payer: COMMERCIAL

## 2022-06-08 DIAGNOSIS — F51.01 PRIMARY INSOMNIA: ICD-10-CM

## 2022-06-08 DIAGNOSIS — Z13.228 SCREENING FOR METABOLIC DISORDER: ICD-10-CM

## 2022-06-08 LAB
ABSOLUTE EOS #: 0.24 K/UL (ref 0–0.44)
ABSOLUTE IMMATURE GRANULOCYTE: 0.03 K/UL (ref 0–0.3)
ABSOLUTE LYMPH #: 2 K/UL (ref 1.1–3.7)
ABSOLUTE MONO #: 0.53 K/UL (ref 0.1–1.2)
ALBUMIN SERPL-MCNC: 4.5 G/DL (ref 3.5–5.2)
ALT SERPL-CCNC: 18 U/L (ref 5–33)
ANION GAP SERPL CALCULATED.3IONS-SCNC: 11 MMOL/L (ref 9–17)
AST SERPL-CCNC: 17 U/L
BASOPHILS # BLD: 1 % (ref 0–2)
BASOPHILS ABSOLUTE: 0.04 K/UL (ref 0–0.2)
BUN BLDV-MCNC: 12 MG/DL (ref 6–20)
CALCIUM SERPL-MCNC: 9.3 MG/DL (ref 8.6–10.4)
CHLORIDE BLD-SCNC: 103 MMOL/L (ref 98–107)
CHOLESTEROL/HDL RATIO: 4
CHOLESTEROL: 213 MG/DL
CO2: 21 MMOL/L (ref 20–31)
CREAT SERPL-MCNC: 0.84 MG/DL (ref 0.5–0.9)
EOSINOPHILS RELATIVE PERCENT: 3 % (ref 1–4)
ESTIMATED AVERAGE GLUCOSE: 103 MG/DL
GFR AFRICAN AMERICAN: >60 ML/MIN
GFR NON-AFRICAN AMERICAN: >60 ML/MIN
GFR SERPL CREATININE-BSD FRML MDRD: NORMAL ML/MIN/{1.73_M2}
GLUCOSE BLD-MCNC: 90 MG/DL (ref 70–99)
HBA1C MFR BLD: 5.2 % (ref 4–6)
HCT VFR BLD CALC: 42.5 % (ref 36.3–47.1)
HDLC SERPL-MCNC: 53 MG/DL
HEMOGLOBIN: 14 G/DL (ref 11.9–15.1)
IMMATURE GRANULOCYTES: 0 %
LDL CHOLESTEROL: 139 MG/DL (ref 0–130)
LYMPHOCYTES # BLD: 27 % (ref 24–43)
MAGNESIUM: 2.1 MG/DL (ref 1.6–2.6)
MCH RBC QN AUTO: 30.4 PG (ref 25.2–33.5)
MCHC RBC AUTO-ENTMCNC: 32.9 G/DL (ref 28.4–34.8)
MCV RBC AUTO: 92.4 FL (ref 82.6–102.9)
MONOCYTES # BLD: 7 % (ref 3–12)
NRBC AUTOMATED: 0 PER 100 WBC
PDW BLD-RTO: 13.2 % (ref 11.8–14.4)
PHOSPHORUS: 3 MG/DL (ref 2.6–4.5)
PLATELET # BLD: 227 K/UL (ref 138–453)
PMV BLD AUTO: 11.6 FL (ref 8.1–13.5)
POTASSIUM SERPL-SCNC: 4.3 MMOL/L (ref 3.7–5.3)
RBC # BLD: 4.6 M/UL (ref 3.95–5.11)
SEG NEUTROPHILS: 62 % (ref 36–65)
SEGMENTED NEUTROPHILS ABSOLUTE COUNT: 4.51 K/UL (ref 1.5–8.1)
SODIUM BLD-SCNC: 135 MMOL/L (ref 135–144)
TRIGL SERPL-MCNC: 104 MG/DL
TSH SERPL DL<=0.05 MIU/L-ACNC: 2.9 UIU/ML (ref 0.3–5)
WBC # BLD: 7.4 K/UL (ref 3.5–11.3)

## 2022-06-08 PROCEDURE — 84450 TRANSFERASE (AST) (SGOT): CPT

## 2022-06-08 PROCEDURE — 80069 RENAL FUNCTION PANEL: CPT

## 2022-06-08 PROCEDURE — 80061 LIPID PANEL: CPT

## 2022-06-08 PROCEDURE — 85025 COMPLETE CBC W/AUTO DIFF WBC: CPT

## 2022-06-08 PROCEDURE — 84460 ALANINE AMINO (ALT) (SGPT): CPT

## 2022-06-08 PROCEDURE — 84443 ASSAY THYROID STIM HORMONE: CPT

## 2022-06-08 PROCEDURE — 36415 COLL VENOUS BLD VENIPUNCTURE: CPT

## 2022-06-08 PROCEDURE — 83735 ASSAY OF MAGNESIUM: CPT

## 2022-06-08 PROCEDURE — 83036 HEMOGLOBIN GLYCOSYLATED A1C: CPT

## 2022-06-15 ENCOUNTER — PATIENT MESSAGE (OUTPATIENT)
Dept: NEUROLOGY | Age: 37
End: 2022-06-15

## 2022-06-16 NOTE — TELEPHONE ENCOUNTER
From: Rebecca Paredes  To: Dr. Sanjuana Matta: 6/15/2022 6:51 PM EDT  Subject: Botox injection     I've been having extreme pain in my neck from my torticollis, I'm wondering if I get Botox injections if these would help with this.

## 2022-06-17 NOTE — TELEPHONE ENCOUNTER
I believe patient has received Botox injection in the past with improvement of symptoms. Okay to schedule for the Botox in next 2 to 3 weeks.   Thank you

## 2022-06-20 ENCOUNTER — TELEPHONE (OUTPATIENT)
Dept: NEUROLOGY | Age: 37
End: 2022-06-20

## 2022-06-20 ENCOUNTER — OFFICE VISIT (OUTPATIENT)
Dept: NEUROLOGY | Age: 37
End: 2022-06-20
Payer: COMMERCIAL

## 2022-06-20 VITALS
BODY MASS INDEX: 38.49 KG/M2 | WEIGHT: 254 LBS | HEIGHT: 68 IN | SYSTOLIC BLOOD PRESSURE: 130 MMHG | DIASTOLIC BLOOD PRESSURE: 94 MMHG | HEART RATE: 86 BPM

## 2022-06-20 DIAGNOSIS — R25.1 TREMOR: ICD-10-CM

## 2022-06-20 DIAGNOSIS — G24.3 SPASMODIC TORTICOLLIS: ICD-10-CM

## 2022-06-20 DIAGNOSIS — R51.9 HEADACHE DISORDER: Primary | ICD-10-CM

## 2022-06-20 PROCEDURE — 99214 OFFICE O/P EST MOD 30 MIN: CPT | Performed by: NURSE PRACTITIONER

## 2022-06-20 NOTE — PROGRESS NOTES
[facility]            [address]          [facility], [address] [facility]          Dept: [phone]          Dept Fax: [phone]    MD Apolonia Glez MD Stefanie Brookes, MD Glennda Musty, CNP            6/20/2022      HISTORY OF PRESENT ILLNESS:       I had the pleasure of seeing Dakota Solomon, who returns for continuing neurologic care. The patient was seen last on December 29, 2022 for treatment of chronic migraine headaches, right upper extremity tremor and spasmodic torticollis. The patient was previously seen by Dr. Jane Chu and all of the records and imaging studies were carefully reviewed. For prophylaxis of her chronic migraine headaches she was prescribed topiramate  mg nightly. She recently has been noticing a worsening of her migraine headaches however notes she is able to tolerate them. She typically gets 1-2 headaches/week however declines an abortive medication at today's visit. She also has spasmodic torticollis and was previously receiving botox injections. She reports today that she has been noticing a return of her torticollis in the past month. She notes that her head has been tilting towards the left causing strain on the right side of her neck. She notes that she has been noticing a worsening of this pain recently and is interested in receiving botox injections at today's visit. The patient also has tremor primarily affecting her right upper extremity.          Testing reviewed:    Lab Results   Component Value Date     LDLCALC 142 08/29/2017     LDLCHOLESTEROL 133 (H) 09/08/2020      No components found for: CHLPL          Lab Results   Component Value Date     TRIG 83 09/08/2020     TRIG 59 08/29/2017              Lab Results   Component Value Date     HDL 54 09/08/2020     HDL 50 08/29/2017              Lab Results   Component Value Date     LDLCALC 142 08/29/2017      No results found for: LABVLDL Lab Results   Component Value Date     LABA1C 5.5 09/08/2020              Lab Results   Component Value Date      09/08/2020      No results found for: Jose D Miller   Neurological work up:  CT head  CTA head and neck  MRI brain 9/11/2019 No acute intracranial abnormality.  No abnormal postcontrast enhancement. Small linear FLAIR signal focus within the right frontal lobe white matter that is nonspecific and of uncertain significance. This may relate to chronic headaches. 2 D echo          PAST MEDICAL HISTORY:         Diagnosis Date    Intention tremor     TBI (traumatic brain injury) ([facility] [address]         PAST SURGICAL HISTORY:         Procedure Laterality Date    CARPAL TUNNEL RELEASE      EYE SURGERY      INGUINAL HERNIA REPAIR      URETER STENT PLACEMENT          SOCIAL HISTORY:     Social History     Socioeconomic History    Marital status:      Spouse name: Not on file    Number of children: Not on file    Years of education: Not on file    Highest education level: Not on file   Occupational History    Not on file   Tobacco Use    Smoking status: Never Smoker    Smokeless tobacco: Never Used   Vaping Use    Vaping Use: Never used   Substance and Sexual Activity    Alcohol use: Yes     Alcohol/week: 1.0 standard drink     Types: 1 Cans of beer per week    Drug use: No    Sexual activity: Not on file   Other Topics Concern    Not on file   Social History Narrative    Not on file     Social Determinants of Health     Financial Resource Strain: Low Risk     Difficulty of Paying Living Expenses: Not hard at all   Food Insecurity: No Food Insecurity    Worried About [address] in the Last Year: Never true    [facility] in the Last Year: Never true   Transportation Needs:     Lack of Transportation (Medical): Not on file    Lack of Transportation (Non-Medical):  Not on file   Physical Activity:     Days of Exercise per Week: Not on file    Minutes of Exercise per Session: Not on file   Stress:     Feeling of Stress : Not on file   Social Connections:     Frequency of Communication with Friends and Family: Not on file    Frequency of Social Gatherings with Friends and Family: Not on file    Attends Muslim Services: Not on file    Active Member of [address] [place] [address] or Organizations: Not on file    Attends Club or Organization Meetings: Not on file    Marital Status: Not on file   Intimate Partner Violence:     Fear of Current or Ex-Partner: Not on file    Emotionally Abused: Not on file    Physically Abused: Not on file    Sexually Abused: Not on file   Housing Stability:     Unable to Pay for Housing in the Last Year: Not on file    Number of [place] in the Last Year: Not on file    Unstable Housing in the Last Year: Not on file       CURRENT MEDICATIONS:     Current Outpatient Medications   Medication Sig Dispense Refill    traZODone (DESYREL) 50 MG tablet Take 1-2 tablets one hour prior to bedtime. 60 tablet 1    TROKENDI  MG CP24 take 1 capsule by mouth at bedtime 90 capsule 2    amitriptyline (ELAVIL) 25 MG tablet Take 1 tablet by mouth nightly (Patient not taking: Reported on 6/20/2022) 30 tablet 1     No current facility-administered medications for this visit. ALLERGIES:   No Known Allergies                              REVIEW OF SYSTEMS        All items selected indicate a positive finding. Those items not selected are negative.   Constitutional [] Weight loss/gain   [] Fatigue  [] Fever/Chills   HEENT [] Hearing Loss  [] Visual Disturbance  [] Tinnitus  [] Eye pain   Respiratory [] Shortness of Breath  [] Cough  [] Snoring   Cardiovascular [] Chest Pain  [] Palpitations  [] Lightheaded   GI [] Constipation  [] Diarrhea  [] Swallowing change  [x] Nausea/vomiting    [] Urinary Frequency  [] Urinary Urgency   Musculoskeletal [x] Neck pain  [] Back pain  [] Muscle pain  [] Restless legs   Dermatologic [] Skin changes   Neurologic [] Memory loss/confusion  [] Seizures  [] Trouble walking or imbalance  [] Dizziness  [] Sleep disturbance  [] Weakness  [] Numbness  [x] Tremors  [] Speech Difficulty  [x] Headaches  [x] Light Sensitivity  [x] Sound Sensitivity   Endocrinology []Excessive thirst  []Excessive hunger   Psychiatric [] Anxiety/Depression  [] Hallucination   Allergy/immunology []Hives/environmental allergies   Hematologic/lymph [] Abnormal bleeding  [] Abnormal bruising         PHYSICAL EXAMINATION:       Vitals:    06/20/22 1334   BP: (!) 132/91   Pulse: 84                                              .                                                                                                    General Appearance:  Alert, cooperative, no signs of distress, appears stated age   Head:  Normocephalic, no signs of trauma   Eyes:  Conjunctiva/corneas clear;  eyelids intact   Ears:  Normal external ear and canals   Nose: Nares normal, mucosa normal, no drainage    Throat: Lips and tongue normal; teeth normal;  gums normal   Neck: Supple, intact flexion, extension and rotation;   trachea midline;  no adenopathy;   thyroid: not enlarged;   no carotid pulse abnormality   Back:   Symmetric, no curvature, ROM adequate   Lungs:   Respirations unlabored   Heart:  Regular rate and rhythm           Extremities: Extremities normal, no cyanosis, no edema   Pulses: Symmetric over head and neck   Skin: Skin color, texture normal, no rashes, no lesions                                     NEUROLOGIC EXAMINATION    Neurologic Exam  Mental status    Alert and oriented x 3; intact memory with no confusion, speech or language problems; no hallucinations or delusions  Fund of information appropriate for level of education    Cranial nerves    II - visual fields intact to confrontation bilaterally  III, IV, VI - extra-ocular muscles full: no pupillary defect; no SUSANNAH, no nystagmus, no ptosis   V - normal facial sensation VII - normal facial symmetry                                                             VIII - intact hearing                                                                             IX, X - symmetrical palate                                                                  XI - symmetrical shoulder shrug                                                       XII - tongue midline without atrophy or fasciculation      Motor function  Normal muscle bulk and tone; strength 5/5 on all 4 extremities, no pronator drift      Sensory function Intact to light touch, pinprick, vibration, proprioception on all 4 extremities      Cerebellar Intact fine motor movement. No involuntary movements or tremors. No ataxia or dysmetria on finger to nose or heel to shin testing      Reflex function DTR 2+ on bilateral UE and LE, symmetric. Down going toes bilaterally      Gait                   normal base and arm swing                  Medical Decision Making: In summary, your patient, Rebecca Paredes exhibits the following, with associated plan:    1. Chronic migraine headaches without aura, having 1-2 migraine headaches/month  1. Continue trokendi  mg at bedtime daily  2. The patient continues to decline an abortive medication at today's visit   2. Spasmodic torticollis, recently worsening with almost constant pulling of her head to the left  1. Restart botox injections with Dr. Ximena Perkins  3. Right upper extremity tremor  1.  Continue to follow with Dr. Ximena Perkins            Signed: Kena Nix, ALICIA      *Please note that portions of this note were completed with a voice recognition program.  Although every effort was made to insure the accuracy of this automated transcription, some errors in transcription may have occurred, occasionally words and are mis-transcribed    Provider Attestation: The documentation recorded by the scribe accurately reflects the service I personally performed and the decisions made by

## 2022-06-20 NOTE — TELEPHONE ENCOUNTER
Last visit in Dec. 2021 pt. was doing okay and Dr. Elin Benites note states no need for Botox. Added her in with Abelardo Given CNP to have exam and documentation for ins. prior auth.  for Botox inj. by Dr. Yordan Muñoz

## 2022-06-28 NOTE — TELEPHONE ENCOUNTER
Pharmacy requesting refill of TROKENDI  MG CP24      Medication active on med list yes      Date of last Rx: 09/20/2021 with 2 refills          verified by CIRO CERDA      Date of last appointment 06/20/2022    Next Visit Date:  12/13/2022

## 2022-06-29 RX ORDER — TOPIRAMATE 100 MG/1
CAPSULE, EXTENDED RELEASE ORAL
Qty: 90 CAPSULE | Refills: 2 | Status: SHIPPED | OUTPATIENT
Start: 2022-06-29

## 2022-07-21 ENCOUNTER — OFFICE VISIT (OUTPATIENT)
Dept: FAMILY MEDICINE CLINIC | Age: 37
End: 2022-07-21
Payer: COMMERCIAL

## 2022-07-21 VITALS
OXYGEN SATURATION: 97 % | TEMPERATURE: 97.5 F | HEART RATE: 83 BPM | WEIGHT: 256 LBS | SYSTOLIC BLOOD PRESSURE: 126 MMHG | DIASTOLIC BLOOD PRESSURE: 84 MMHG | BODY MASS INDEX: 38.92 KG/M2

## 2022-07-21 DIAGNOSIS — F51.01 PRIMARY INSOMNIA: Primary | ICD-10-CM

## 2022-07-21 PROCEDURE — 99213 OFFICE O/P EST LOW 20 MIN: CPT | Performed by: FAMILY MEDICINE

## 2022-07-21 RX ORDER — TRAZODONE HYDROCHLORIDE 50 MG/1
TABLET ORAL
Qty: 180 TABLET | Refills: 3 | Status: SHIPPED | OUTPATIENT
Start: 2022-07-21

## 2022-07-21 ASSESSMENT — PATIENT HEALTH QUESTIONNAIRE - PHQ9
SUM OF ALL RESPONSES TO PHQ QUESTIONS 1-9: 0
SUM OF ALL RESPONSES TO PHQ QUESTIONS 1-9: 0
SUM OF ALL RESPONSES TO PHQ9 QUESTIONS 1 & 2: 0
1. LITTLE INTEREST OR PLEASURE IN DOING THINGS: 0
SUM OF ALL RESPONSES TO PHQ QUESTIONS 1-9: 0
2. FEELING DOWN, DEPRESSED OR HOPELESS: 0
SUM OF ALL RESPONSES TO PHQ QUESTIONS 1-9: 0

## 2022-07-21 NOTE — PROGRESS NOTES
Progress Note    Deisi Mora is a 43 y.o.  female who presents today alone for evaluation of   Chief Complaint   Patient presents with    Insomnia     F/u           HPI:   Patient is here for follow up on insomnia. Patient states she takes 1 tab of trazodone about 1 hr prior to bedtime and is able to obtain 6-8 hrs of sleep. Patient denies vivid dreams or AM fatigue. Patient denies other issues and/or complaints today. Health Maintenance Due   Topic Date Due    Varicella vaccine (1 of 2 - 2-dose childhood series) Never done    Hepatitis C screen  Never done    Cervical cancer screen  Never done        Current Medications:     Current Outpatient Medications   Medication Sig Dispense Refill    traZODone (DESYREL) 50 MG tablet Take 1-2 tablets one hour prior to bedtime. 180 tablet 3    TROKENDI  MG CP24 take 1 capsule by mouth at bedtime 90 capsule 2     No current facility-administered medications for this visit. Allergies:   No Known Allergies     Medical History:     Past Medical History:   Diagnosis Date    Intention tremor     TBI (traumatic brain injury) ([facility] [address]        Past Surgical History:   Procedure Laterality Date    CARPAL TUNNEL RELEASE      EYE SURGERY      INGUINAL HERNIA REPAIR      URETER STENT PLACEMENT         Family History   Problem Relation Age of Onset    Diabetes type 2  Maternal Grandmother         Social History:     Social History     Socioeconomic History    Marital status:      Spouse name: Not on file    Number of children: Not on file    Years of education: Not on file    Highest education level: Not on file   Occupational History    Not on file   Tobacco Use    Smoking status: Never    Smokeless tobacco: Never   Vaping Use    Vaping Use: Never used   Substance and Sexual Activity    Alcohol use:  Yes     Alcohol/week: 1.0 standard drink     Types: 1 Cans of beer per week    Drug use: No    Sexual activity: Not on file   Other Topics Concern    Not on file Visit on 06/08/2022   Component Date Value Ref Range Status    ALT 06/08/2022 18  5 - 33 U/L Final    AST 06/08/2022 17  <32 U/L Final    WBC 06/08/2022 7.4  3.5 - 11.3 k/uL Final    RBC 06/08/2022 4.60  3.95 - 5.11 m/uL Final    Hemoglobin 06/08/2022 14.0  11.9 - 15.1 g/dL Final    Hematocrit 06/08/2022 42.5  36.3 - 47.1 % Final    MCV 06/08/2022 92.4  82.6 - 102.9 fL Final    MCH 06/08/2022 30.4  25.2 - 33.5 pg Final    MCHC 06/08/2022 32.9  28.4 - 34.8 g/dL Final    RDW 06/08/2022 13.2  11.8 - 14.4 % Final    Platelets 40/41/3389 227  138 - 453 k/uL Final    MPV 06/08/2022 11.6  8.1 - 13.5 fL Final    NRBC Automated 06/08/2022 0.0  0.0 per 100 WBC Final    Seg Neutrophils 06/08/2022 62  36 - 65 % Final    Lymphocytes 06/08/2022 27  24 - 43 % Final    Monocytes 06/08/2022 7  3 - 12 % Final    Eosinophils % 06/08/2022 3  1 - 4 % Final    Basophils 06/08/2022 1  0 - 2 % Final    Immature Granulocytes 06/08/2022 0  0 % Final    Segs Absolute 06/08/2022 4.51  1.50 - 8.10 k/uL Final    Absolute Lymph # 06/08/2022 2.00  1. 10 - 3.70 k/uL Final    Absolute Mono # 06/08/2022 0.53  0.10 - 1.20 k/uL Final    Absolute Eos # 06/08/2022 0.24  0.00 - 0.44 k/uL Final    Basophils Absolute 06/08/2022 0.04  0.00 - 0.20 k/uL Final    Absolute Immature Granulocyte 06/08/2022 0.03  0.00 - 0.30 k/uL Final    Hemoglobin A1C 06/08/2022 5.2  4.0 - 6.0 % Final    Estimated Avg Glucose 06/08/2022 103  mg/dL Final    Comment: The ADA and AACC recommend providing the estimated average glucose result to permit better   patient understanding of their HBA1c result.       Cholesterol 06/08/2022 213 (A) <200 mg/dL Final    Comment:    Cholesterol Guidelines:      <200  Desirable   200-240  Borderline      >240  Undesirable         HDL 06/08/2022 53  >40 mg/dL Final    Comment:    HDL Guidelines:    <40     Undesirable   40-59    Borderline    >59     Desirable         LDL Cholesterol 06/08/2022 139 (A) 0 - 130 mg/dL Final    Comment:    LDL Guidelines:     <100    Desirable   100-129   Near to/above Desirable   130-159   Borderline      >159   Undesirable     Direct (measured) LDL and calculated LDL are not interchangeable tests. Chol/HDL Ratio 06/08/2022 4.0  <5 Final            Triglycerides 06/08/2022 104  <150 mg/dL Final    Comment:    Triglyceride Guidelines:     <150   Desirable   150-199  Borderline   200-499  High     >499   Very high   Based on AHA Guidelines for fasting triglyceride, October 2012. Magnesium 06/08/2022 2.1  1.6 - 2.6 mg/dL Final    Glucose 06/08/2022 90  70 - 99 mg/dL Final    BUN 06/08/2022 12  6 - 20 mg/dL Final    Creatinine 06/08/2022 0.84  0.50 - 0.90 mg/dL Final    Calcium 06/08/2022 9.3  8.6 - 10.4 mg/dL Final    Albumin 06/08/2022 4.5  3.5 - 5.2 g/dL Final    Phosphorus 06/08/2022 3.0  2.6 - 4.5 mg/dL Final    Sodium 06/08/2022 135  135 - 144 mmol/L Final    Potassium 06/08/2022 4.3  3.7 - 5.3 mmol/L Final    Chloride 06/08/2022 103  98 - 107 mmol/L Final    CO2 06/08/2022 21  20 - 31 mmol/L Final    Anion Gap 06/08/2022 11  9 - 17 mmol/L Final    GFR Non- 06/08/2022 >60  >60 mL/min Final    GFR  06/08/2022 >60  >60 mL/min Final    GFR Comment 06/08/2022        Final    Comment: Average GFR for 30-36 years old:   80 mL/min/1.73sq m  Chronic Kidney Disease:   <60 mL/min/1.73sq m  Kidney failure:   <15 mL/min/1.73sq m              eGFR calculated using average adult body mass. Additional eGFR calculator available at:        [email or web address]            TSH 06/08/2022 2.90  0.30 - 5.00 uIU/mL Final       No results found for this visit on 07/21/22. Assessment/Plan:     Ankit Kelley was seen today for insomnia. Diagnoses and all orders for this visit:    Primary insomnia  -     traZODone (DESYREL) 50 MG tablet; Take 1-2 tablets one hour prior to bedtime. Insomnia improved. Refill as above.     All questions answered and addressed to patient satisfaction. Patient understands and agrees to the plan. The patient was evaluated and treated today based on the osteopathic principle that each person is a unit of body, mind, and spirit, the body is capable of self-regulation, self-healing, and health maintenance and that structure and function are reciprocally interrelated. Follow-up:   Return if symptoms worsen or fail to improve.       Isis Drake D.O.

## 2022-08-12 ENCOUNTER — TELEPHONE (OUTPATIENT)
Dept: NEUROLOGY | Age: 37
End: 2022-08-12

## 2022-08-12 NOTE — TELEPHONE ENCOUNTER
Received a fax from pharmacy stating Trokendi  mg needs PA. This was completed on CM. Received instant approval through 8/12/2023.

## 2022-09-29 NOTE — TELEPHONE ENCOUNTER
[facility] [phone]. Codes [id number] amd [id number] and [id number] do not need PA but Predetermination is recommended for . Faxed clinicals to [phone]. I called Deisi and told her we can go ahead and schedule her as soon as we get a cancellation with Dr. Bev Reno. She is agreeable.

## 2022-10-06 ENCOUNTER — PROCEDURE VISIT (OUTPATIENT)
Dept: NEUROLOGY | Age: 37
End: 2022-10-06
Payer: COMMERCIAL

## 2022-10-06 DIAGNOSIS — M43.6 TORTICOLLIS: Primary | ICD-10-CM

## 2022-10-06 PROCEDURE — 95874 GUIDE NERV DESTR NEEDLE EMG: CPT | Performed by: PSYCHIATRY & NEUROLOGY

## 2022-10-06 PROCEDURE — 64616 CHEMODENERV MUSC NECK DYSTON: CPT | Performed by: PSYCHIATRY & NEUROLOGY

## 2022-10-11 NOTE — PROGRESS NOTES
Botox injections indications spasmodic torticollis  Procedure was explained to the patient and consent was signed. Monopolar EMG guided Botox injections were used in the following muscles    100 units left levator scapulae  50  units left trazepius   50 units left medius and anterior      Total amount of Botox A used 200 units   Total amount of Botox discarded 0 units   Patient tolerated the procedure well without any complications with minimal blood loss.

## 2022-10-12 ENCOUNTER — TELEPHONE (OUTPATIENT)
Dept: NEUROLOGY | Age: 37
End: 2022-10-12

## 2022-10-12 NOTE — TELEPHONE ENCOUNTER
I would keep an eye on the symptoms. Can you please call the patient tomorrow to see if symptoms are improving or not. If there is a concern, we can bring the patient in the clinic for an unscheduled quick evaluation.   Thank you

## 2022-10-12 NOTE — TELEPHONE ENCOUNTER
Pt called in stating that she had Botox last week and she said she feels like her throat is swollen. She said that she was eating last night and had issues getting the food down. She said that she is not having any issues with liquids. I double checked that it was not like an anaphylaxis feeling, more like the muscles were tight, she said yes. She is wondering if the dose of Botox was more than what she was getting previously and her body is just used to it? She also said that her neck is sore and it feels like her head wants to always fall that way. Please advise, thanks.

## 2022-10-13 NOTE — TELEPHONE ENCOUNTER
I called Deisi. She said that she did go to Urgent Care. They said her throat is swollen and inflammed and she had sinus congestion. They feel she has a virus and they did give her some steroids. She does not feel it is related to Botox.

## 2022-11-22 ENCOUNTER — TELEPHONE (OUTPATIENT)
Dept: NEUROLOGY | Age: 37
End: 2022-11-22

## 2022-11-22 NOTE — TELEPHONE ENCOUNTER
When I called to schedule patient's January Botox she stated that she is going to hold off on scheduling Botox at this time and she will talk to Dr. Katja Palumbo about it at her visit on December 13th.   KS

## 2022-12-12 ENCOUNTER — OFFICE VISIT (OUTPATIENT)
Dept: FAMILY MEDICINE CLINIC | Age: 37
End: 2022-12-12
Payer: COMMERCIAL

## 2022-12-12 VITALS
BODY MASS INDEX: 38.77 KG/M2 | SYSTOLIC BLOOD PRESSURE: 120 MMHG | WEIGHT: 255 LBS | HEART RATE: 96 BPM | DIASTOLIC BLOOD PRESSURE: 70 MMHG | OXYGEN SATURATION: 97 %

## 2022-12-12 DIAGNOSIS — Z23 NEEDS FLU SHOT: ICD-10-CM

## 2022-12-12 DIAGNOSIS — R25.1 TREMOR: ICD-10-CM

## 2022-12-12 DIAGNOSIS — S06.9X9D TRAUMATIC BRAIN INJURY WITH LOSS OF CONSCIOUSNESS, SUBSEQUENT ENCOUNTER: ICD-10-CM

## 2022-12-12 DIAGNOSIS — G24.3 SPASMODIC TORTICOLLIS: Primary | ICD-10-CM

## 2022-12-12 DIAGNOSIS — F51.01 PRIMARY INSOMNIA: ICD-10-CM

## 2022-12-12 DIAGNOSIS — Z87.828 HISTORY OF MOTOR VEHICLE ACCIDENT: ICD-10-CM

## 2022-12-12 DIAGNOSIS — H53.2 DOUBLE VISION: ICD-10-CM

## 2022-12-12 PROBLEM — M77.02 MEDIAL EPICONDYLITIS OF ELBOW, LEFT: Status: RESOLVED | Noted: 2021-11-30 | Resolved: 2022-12-12

## 2022-12-12 PROBLEM — N13.30 HYDRONEPHROSIS: Status: RESOLVED | Noted: 2019-01-23 | Resolved: 2022-12-12

## 2022-12-12 PROBLEM — M54.2 CERVICALGIA: Status: ACTIVE | Noted: 2022-12-12

## 2022-12-12 PROBLEM — N13.5 URETERAL STRICTURE, LEFT: Status: RESOLVED | Noted: 2019-04-24 | Resolved: 2022-12-12

## 2022-12-12 PROBLEM — S06.9XAA TRAUMATIC BRAIN INJURY (HCC): Status: ACTIVE | Noted: 2022-12-12

## 2022-12-12 PROBLEM — M77.02 MEDIAL EPICONDYLITIS OF ELBOW, LEFT: Status: ACTIVE | Noted: 2021-11-30

## 2022-12-12 PROBLEM — M54.2 CERVICALGIA: Status: RESOLVED | Noted: 2022-12-12 | Resolved: 2022-12-12

## 2022-12-12 PROCEDURE — 90674 CCIIV4 VAC NO PRSV 0.5 ML IM: CPT | Performed by: FAMILY MEDICINE

## 2022-12-12 PROCEDURE — 99214 OFFICE O/P EST MOD 30 MIN: CPT | Performed by: FAMILY MEDICINE

## 2022-12-12 PROCEDURE — 90471 IMMUNIZATION ADMIN: CPT | Performed by: FAMILY MEDICINE

## 2022-12-12 ASSESSMENT — ENCOUNTER SYMPTOMS
VISUAL CHANGE: 1
PHOTOPHOBIA: 0
ALLERGIC/IMMUNOLOGIC NEGATIVE: 1
GASTROINTESTINAL NEGATIVE: 1
TROUBLE SWALLOWING: 0
EYES NEGATIVE: 1
RESPIRATORY NEGATIVE: 1
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
## 2022-12-13 ENCOUNTER — OFFICE VISIT (OUTPATIENT)
Dept: NEUROLOGY | Age: 37
End: 2022-12-13
Payer: COMMERCIAL

## 2022-12-13 VITALS
WEIGHT: 262 LBS | HEART RATE: 96 BPM | SYSTOLIC BLOOD PRESSURE: 117 MMHG | BODY MASS INDEX: 39.71 KG/M2 | HEIGHT: 68 IN | DIASTOLIC BLOOD PRESSURE: 85 MMHG

## 2022-12-13 DIAGNOSIS — R51.9 HEADACHE DISORDER: Primary | ICD-10-CM

## 2022-12-13 DIAGNOSIS — M43.6 TORTICOLLIS: ICD-10-CM

## 2022-12-13 PROCEDURE — 99214 OFFICE O/P EST MOD 30 MIN: CPT | Performed by: PSYCHIATRY & NEUROLOGY
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
## 2023-06-08 RX ORDER — TOPIRAMATE 100 MG/1
CAPSULE, EXTENDED RELEASE ORAL
Qty: 90 CAPSULE | Refills: 0 | Status: SHIPPED | OUTPATIENT
Start: 2023-06-08

## 2023-06-08 NOTE — TELEPHONE ENCOUNTER
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 Brad Otero. Pharmacy sent a request for a new Trokendi  mg Rx.   Since Dr. Viet Dewitt is out of the office would you be willing to approve this requst?         Medication active on med list: yes      Date of last fill: 6/29/22 for #90 and 2 refills  verified on 6/8/2023    verified by David Chase., NADYAN      Date of last appointment: 12/13/2022    Next Visit Date: 1 yr f/u - scheduled currently unavailable

## 2023-06-20 SDOH — ECONOMIC STABILITY: FOOD INSECURITY: WITHIN THE PAST 12 MONTHS, THE FOOD YOU BOUGHT JUST DIDN'T LAST AND YOU DIDN'T HAVE MONEY TO GET MORE.: NEVER TRUE

## 2023-06-20 SDOH — ECONOMIC STABILITY: TRANSPORTATION INSECURITY
IN THE PAST 12 MONTHS, HAS LACK OF TRANSPORTATION KEPT YOU FROM MEETINGS, WORK, OR FROM GETTING THINGS NEEDED FOR DAILY LIVING?: NO

## 2023-06-20 SDOH — ECONOMIC STABILITY: HOUSING INSECURITY
IN THE LAST 12 MONTHS, WAS THERE A TIME WHEN YOU DID NOT HAVE A STEADY PLACE TO SLEEP OR SLEPT IN A SHELTER (INCLUDING NOW)?: NO

## 2023-06-20 SDOH — ECONOMIC STABILITY: INCOME INSECURITY: HOW HARD IS IT FOR YOU TO PAY FOR THE VERY BASICS LIKE FOOD, HOUSING, MEDICAL CARE, AND HEATING?: NOT HARD AT ALL

## 2023-06-20 SDOH — ECONOMIC STABILITY: FOOD INSECURITY: WITHIN THE PAST 12 MONTHS, YOU WORRIED THAT YOUR FOOD WOULD RUN OUT BEFORE YOU GOT MONEY TO BUY MORE.: NEVER TRUE

## 2023-06-20 ASSESSMENT — PATIENT HEALTH QUESTIONNAIRE - PHQ9
2. FEELING DOWN, DEPRESSED OR HOPELESS: NOT AT ALL
2. FEELING DOWN, DEPRESSED OR HOPELESS: 0
SUM OF ALL RESPONSES TO PHQ QUESTIONS 1-9: 0
SUM OF ALL RESPONSES TO PHQ9 QUESTIONS 1 & 2: 0
SUM OF ALL RESPONSES TO PHQ9 QUESTIONS 1 & 2: 0
1. LITTLE INTEREST OR PLEASURE IN DOING THINGS: 0
SUM OF ALL RESPONSES TO PHQ QUESTIONS 1-9: 0
1. LITTLE INTEREST OR PLEASURE IN DOING THINGS: NOT AT ALL

## 2023-06-23 ENCOUNTER — OFFICE VISIT (OUTPATIENT)
Dept: FAMILY MEDICINE CLINIC | Age: 38
End: 2023-06-23
Payer: COMMERCIAL

## 2023-06-23 VITALS
WEIGHT: 256 LBS | BODY MASS INDEX: 38.92 KG/M2 | DIASTOLIC BLOOD PRESSURE: 70 MMHG | SYSTOLIC BLOOD PRESSURE: 120 MMHG | HEART RATE: 82 BPM | OXYGEN SATURATION: 97 %

## 2023-06-23 DIAGNOSIS — H53.2 DOUBLE VISION: ICD-10-CM

## 2023-06-23 DIAGNOSIS — R73.01 IFG (IMPAIRED FASTING GLUCOSE): ICD-10-CM

## 2023-06-23 DIAGNOSIS — S39.012A STRAIN OF LUMBAR PARASPINAL MUSCLE, INITIAL ENCOUNTER: ICD-10-CM

## 2023-06-23 DIAGNOSIS — M54.50 ACUTE BILATERAL LOW BACK PAIN WITHOUT SCIATICA: Primary | ICD-10-CM

## 2023-06-23 DIAGNOSIS — R25.1 TREMOR: ICD-10-CM

## 2023-06-23 DIAGNOSIS — G24.3 SPASMODIC TORTICOLLIS: ICD-10-CM

## 2023-06-23 DIAGNOSIS — E78.5 DYSLIPIDEMIA: ICD-10-CM

## 2023-06-23 DIAGNOSIS — S06.9X9D TRAUMATIC BRAIN INJURY WITH LOSS OF CONSCIOUSNESS, SUBSEQUENT ENCOUNTER: ICD-10-CM

## 2023-06-23 DIAGNOSIS — E83.42 HYPOMAGNESEMIA: ICD-10-CM

## 2023-06-23 DIAGNOSIS — M54.12 CERVICAL RADICULOPATHY: ICD-10-CM

## 2023-06-23 PROCEDURE — 99214 OFFICE O/P EST MOD 30 MIN: CPT | Performed by: FAMILY MEDICINE

## 2023-06-23 ASSESSMENT — ENCOUNTER SYMPTOMS
EYES NEGATIVE: 1
ALLERGIC/IMMUNOLOGIC NEGATIVE: 1
GASTROINTESTINAL NEGATIVE: 1
RESPIRATORY NEGATIVE: 1
BACK PAIN: 1
TROUBLE SWALLOWING: 0
VISUAL CHANGE: 1
BOWEL INCONTINENCE: 0
PHOTOPHOBIA: 0
ABDOMINAL PAIN: 0

## 2023-06-23 NOTE — PATIENT INSTRUCTIONS
Patient Education        Back Stretches: Exercises  Introduction  Here are some examples of exercises for stretching your back. Start each exercise slowly. Ease off the exercise if you start to have pain. Your doctor or physical therapist will tell you when you can start these exercises and which ones will work best for you. How to do the exercises  Overhead stretch    Stand comfortably with your feet shoulder-width apart. Looking straight ahead, raise both arms over your head and reach toward the ceiling. Do not allow your head to tilt back. Hold for 15 to 30 seconds, then lower your arms to your sides. Repeat 2 to 4 times. Side stretch    Stand comfortably with your feet shoulder-width apart. Raise one arm over your head, and then lean to the other side. Slide your hand down your leg as you let the weight of your arm gently stretch your side muscles. Hold for 15 to 30 seconds. Repeat 2 to 4 times on each side. Press-up    Lie on your stomach, supporting your body with your forearms. Press your elbows down into the floor to raise your upper back. As you do this, relax your stomach muscles and allow your back to arch without using your back muscles. As your press up, do not let your hips or pelvis come off the floor. Hold for 15 to 30 seconds, then relax. Repeat 2 to 4 times. Relax and rest    Lie on your back with a rolled towel under your neck and a pillow under your knees. Extend your arms comfortably to your sides. Relax and breathe normally. Remain in this position for about 10 minutes. If you can, do this 2 or 3 times each day. Follow-up care is a key part of your treatment and safety. Be sure to make and go to all appointments, and call your doctor if you are having problems. It's also a good idea to know your test results and keep a list of the medicines you take. Current as of: November 9, 2022               Content Version: 13.7  © 2006-2023 [employer].    Care instructions

## 2023-06-23 NOTE — PROGRESS NOTES
APSO Progress Note    Date:6/23/2023         Patient Name:Deisi Mora     YOB: 1985     Age:37 y.o. Assessment/Plan        Problem List Items Addressed This Visit          Nervous and Auditory    Spasmodic torticollis      Monitored by specialist- no acute findings meriting change in the plan   Needs new neurologist           Relevant Orders    Chandrakant Langston MD, Neurology, [address]    Traumatic brain injury [facility])      Monitored by specialist- no acute findings meriting change in the plan           Relevant Orders    Chandrakant Langston MD, Neurology, [address]       Other    Tremor      Monitored by specialist- no acute findings meriting change in the plan           Relevant Orders    CBC with Auto Differential    Lizabeth Neely MD, Neurology, [address]    Double vision      Monitored by specialist- no acute findings meriting change in the plan           Relevant Orders    Lizabeth Neely [facility], [facility]     Other Visit Diagnoses       Acute bilateral low back pain without sciatica    -  Primary    Home exercises  NSAIDs and OTC remedies    Strain of lumbar paraspinal muscle, initial encounter        Home exercises  NSAIDs and OTC remedies    Dyslipidemia        Relevant Orders    Comprehensive Metabolic Panel    Lipid Panel    IFG (impaired fasting glucose)        Relevant Orders    Hemoglobin A1C    Hypomagnesemia        Relevant Orders    Magnesium    Cervical radiculopathy        Relevant Orders    Chandrakant Langston MD, Neurology, [address]             Return in about 6 months (around 12/23/2023). Electronically signed by Karina Lemus DO on 6/23/23       Total time spent was between Time personally spent assessing and managing the patient on the date of service: Est: 30-39 minutes (45539) mins.  This included time spent reviewing the patient's medical record (e.g., recent visits, labs, and studies); seeing the patient in

## 2023-06-25 ENCOUNTER — PATIENT MESSAGE (OUTPATIENT)
Dept: FAMILY MEDICINE CLINIC | Age: 38
End: 2023-06-25

## 2023-06-25 DIAGNOSIS — M54.50 ACUTE BILATERAL LOW BACK PAIN WITHOUT SCIATICA: Primary | ICD-10-CM

## 2023-06-25 DIAGNOSIS — S39.012A STRAIN OF LUMBAR PARASPINAL MUSCLE, INITIAL ENCOUNTER: ICD-10-CM

## 2023-08-10 ENCOUNTER — TELEPHONE (OUTPATIENT)
Dept: NEUROLOGY | Age: 38
End: 2023-08-10

## 2023-08-10 NOTE — TELEPHONE ENCOUNTER
08 10 2023 I called the patient times 2 (08 02 2023 and 08 07 2023 at [phone])  to schedule December of 2023 follow up appointment with Dr. Jose Olson, both times the patient stated that she will call back to schedule, no response. I mailed the patient a letter asking them to call the office back to schedule this appointment.   KS

## 2023-08-16 ENCOUNTER — TELEPHONE (OUTPATIENT)
Dept: NEUROLOGY | Age: 38
End: 2023-08-16

## 2023-08-16 NOTE — TELEPHONE ENCOUNTER
Received a fax from pharmacy stating Jagjit HAWKINS. This was completed on CMM. PA received immediate approval through 8/15/2024.

## 2023-08-17 ENCOUNTER — PATIENT MESSAGE (OUTPATIENT)
Dept: FAMILY MEDICINE CLINIC | Age: 38
End: 2023-08-17

## 2023-08-17 DIAGNOSIS — M54.12 CERVICAL RADICULOPATHY: ICD-10-CM

## 2023-08-17 DIAGNOSIS — G24.3 SPASMODIC TORTICOLLIS: Primary | ICD-10-CM

## 2023-08-17 DIAGNOSIS — S06.9X9D TRAUMATIC BRAIN INJURY WITH LOSS OF CONSCIOUSNESS, SUBSEQUENT ENCOUNTER: ICD-10-CM

## 2023-08-18 NOTE — TELEPHONE ENCOUNTER
From: Raegan Snell  To: Dr. Alaniz Prior  Sent: 8/17/2023 2:30 AM EDT  Subject: New Neurologist     Just curious if you were able to find me a new neurologist. I am being called by my current one to set up my December appointment and have been putting it off because I just feel like another number and not an actual person in his book. Thanks.

## 2023-10-02 ENCOUNTER — PATIENT MESSAGE (OUTPATIENT)
Dept: FAMILY MEDICINE CLINIC | Age: 38
End: 2023-10-02

## 2023-10-02 DIAGNOSIS — G25.2 INTENTION TREMOR: Primary | ICD-10-CM

## 2023-10-02 DIAGNOSIS — G43.909 MIGRAINE WITHOUT STATUS MIGRAINOSUS, NOT INTRACTABLE, UNSPECIFIED MIGRAINE TYPE: ICD-10-CM

## 2023-10-02 NOTE — TELEPHONE ENCOUNTER
From: [place] Estefanía  Sent: 10/2/2023 1:52 PM EDT  To: [phone]  Subject: Neurologist     It's not Topamax either (that was the first med I tried). I'm on Trokendi now.  My bad

## 2023-10-04 RX ORDER — TOPIRAMATE 100 MG/1
1 CAPSULE, EXTENDED RELEASE ORAL NIGHTLY
Qty: 90 CAPSULE | Refills: 0 | Status: SHIPPED | OUTPATIENT
Start: 2023-10-04

## 2023-10-06 ENCOUNTER — TELEPHONE (OUTPATIENT)
Dept: NEUROLOGY | Age: 38
End: 2023-10-06

## 2023-10-06 NOTE — TELEPHONE ENCOUNTER
10 06 2023 called the patient times 2 (09 21 2023 and 10 06 2023 at [phone]) to schedule appointment with Dr Deep Meraz (patient last saw Dr Deep Meraz 12 21 2022), LMOM both times, no response. I mailed the patient a letter asking them to call the office back to schedule this appointment.   KS

## 2023-12-05 ENCOUNTER — HOSPITAL ENCOUNTER (OUTPATIENT)
Facility: CLINIC | Age: 38
Discharge: HOME | End: 2023-12-05
Payer: COMMERCIAL

## 2023-12-05 DIAGNOSIS — R25.1 TREMOR: ICD-10-CM

## 2023-12-05 DIAGNOSIS — E83.42 HYPOMAGNESEMIA: ICD-10-CM

## 2023-12-05 DIAGNOSIS — R73.01 IFG (IMPAIRED FASTING GLUCOSE): ICD-10-CM

## 2023-12-05 DIAGNOSIS — E78.5 DYSLIPIDEMIA: ICD-10-CM

## 2023-12-05 LAB
ALBUMIN SERPL-MCNC: 4 G/DL (ref 3.5–5.2)
ALBUMIN/GLOB SERPL: 1.4 {RATIO} (ref 1–2.5)
ALP SERPL-CCNC: 75 U/L (ref 35–104)
ALT SERPL-CCNC: 18 U/L (ref 5–33)
ANION GAP SERPL CALCULATED.3IONS-SCNC: 13 MMOL/L (ref 9–17)
AST SERPL-CCNC: 20 U/L
BASOPHILS # BLD: 0.04 K/UL (ref 0–0.2)
BASOPHILS NFR BLD: 1 % (ref 0–2)
BILIRUB SERPL-MCNC: 0.3 MG/DL (ref 0.3–1.2)
BUN SERPL-MCNC: 16 MG/DL (ref 6–20)
CALCIUM SERPL-MCNC: 9 MG/DL (ref 8.6–10.4)
CHLORIDE SERPL-SCNC: 103 MMOL/L (ref 98–107)
CHOLEST SERPL-MCNC: 207 MG/DL
CHOLESTEROL/HDL RATIO: 3.6
CO2 SERPL-SCNC: 22 MMOL/L (ref 20–31)
CREAT SERPL-MCNC: 0.8 MG/DL (ref 0.5–0.9)
EOSINOPHIL # BLD: 0.32 K/UL (ref 0–0.44)
EOSINOPHILS RELATIVE PERCENT: 4 % (ref 1–4)
ERYTHROCYTE [DISTWIDTH] IN BLOOD BY AUTOMATED COUNT: 13.2 % (ref 11.8–14.4)
EST. AVERAGE GLUCOSE BLD GHB EST-MCNC: 100 MG/DL
GFR SERPL CREATININE-BSD FRML MDRD: >60 ML/MIN/1.73M2
GLUCOSE SERPL-MCNC: 87 MG/DL (ref 70–99)
HBA1C MFR BLD: 5.1 % (ref 4–6)
HCT VFR BLD AUTO: 42.3 % (ref 36.3–47.1)
HDLC SERPL-MCNC: 57 MG/DL
HGB BLD-MCNC: 13.8 G/DL (ref 11.9–15.1)
IMM GRANULOCYTES # BLD AUTO: 0.03 K/UL (ref 0–0.3)
IMM GRANULOCYTES NFR BLD: 0 %
LDLC SERPL CALC-MCNC: 134 MG/DL (ref 0–130)
LYMPHOCYTES NFR BLD: 1.97 K/UL (ref 1.1–3.7)
LYMPHOCYTES RELATIVE PERCENT: 25 % (ref 24–43)
MAGNESIUM SERPL-MCNC: 2.3 MG/DL (ref 1.6–2.6)
MCH RBC QN AUTO: 29.2 PG (ref 25.2–33.5)
MCHC RBC AUTO-ENTMCNC: 32.6 G/DL (ref 28.4–34.8)
MCV RBC AUTO: 89.6 FL (ref 82.6–102.9)
MONOCYTES NFR BLD: 0.45 K/UL (ref 0.1–1.2)
MONOCYTES NFR BLD: 6 % (ref 3–12)
NEUTROPHILS NFR BLD: 64 % (ref 36–65)
NEUTS SEG NFR BLD: 5.24 K/UL (ref 1.5–8.1)
NRBC BLD-RTO: 0 PER 100 WBC
PLATELET # BLD AUTO: 244 K/UL (ref 138–453)
PMV BLD AUTO: 11.4 FL (ref 8.1–13.5)
POTASSIUM SERPL-SCNC: 4.3 MMOL/L (ref 3.7–5.3)
PROT SERPL-MCNC: 6.9 G/DL (ref 6.4–8.3)
RBC # BLD AUTO: 4.72 M/UL (ref 3.95–5.11)
SODIUM SERPL-SCNC: 138 MMOL/L (ref 135–144)
TRIGL SERPL-MCNC: 78 MG/DL
WBC OTHER # BLD: 8.1 K/UL (ref 3.5–11.3)

## 2023-12-05 PROCEDURE — 85025 COMPLETE CBC W/AUTO DIFF WBC: CPT

## 2023-12-05 PROCEDURE — 36415 COLL VENOUS BLD VENIPUNCTURE: CPT

## 2023-12-05 PROCEDURE — 83036 HEMOGLOBIN GLYCOSYLATED A1C: CPT

## 2023-12-05 PROCEDURE — 80053 COMPREHEN METABOLIC PANEL: CPT

## 2023-12-05 PROCEDURE — 80061 LIPID PANEL: CPT

## 2023-12-05 PROCEDURE — 83735 ASSAY OF MAGNESIUM: CPT

## 2023-12-07 ENCOUNTER — OFFICE VISIT (OUTPATIENT)
Dept: FAMILY MEDICINE CLINIC | Age: 38
End: 2023-12-07
Payer: COMMERCIAL

## 2023-12-07 VITALS
HEART RATE: 86 BPM | DIASTOLIC BLOOD PRESSURE: 96 MMHG | OXYGEN SATURATION: 99 % | BODY MASS INDEX: 42.06 KG/M2 | TEMPERATURE: 97.5 F | SYSTOLIC BLOOD PRESSURE: 136 MMHG | WEIGHT: 276.6 LBS

## 2023-12-07 DIAGNOSIS — G24.3 SPASMODIC TORTICOLLIS: Primary | ICD-10-CM

## 2023-12-07 DIAGNOSIS — Z23 NEED FOR INFLUENZA VACCINATION: ICD-10-CM

## 2023-12-07 DIAGNOSIS — F39 MOOD DISORDER (HCC): ICD-10-CM

## 2023-12-07 DIAGNOSIS — F41.0 SEVERE ANXIETY WITH PANIC: ICD-10-CM

## 2023-12-07 DIAGNOSIS — K21.9 GASTROESOPHAGEAL REFLUX DISEASE, UNSPECIFIED WHETHER ESOPHAGITIS PRESENT: ICD-10-CM

## 2023-12-07 PROBLEM — G43.909 MIGRAINE WITHOUT STATUS MIGRAINOSUS, NOT INTRACTABLE: Status: ACTIVE | Noted: 2023-12-07

## 2023-12-07 PROBLEM — G25.2 INTENTION TREMOR: Status: ACTIVE | Noted: 2022-06-20

## 2023-12-07 PROCEDURE — 99214 OFFICE O/P EST MOD 30 MIN: CPT | Performed by: FAMILY MEDICINE

## 2023-12-07 PROCEDURE — 90471 IMMUNIZATION ADMIN: CPT | Performed by: FAMILY MEDICINE

## 2023-12-07 PROCEDURE — 90674 CCIIV4 VAC NO PRSV 0.5 ML IM: CPT | Performed by: FAMILY MEDICINE

## 2023-12-07 SDOH — ECONOMIC STABILITY: FOOD INSECURITY: WITHIN THE PAST 12 MONTHS, THE FOOD YOU BOUGHT JUST DIDN'T LAST AND YOU DIDN'T HAVE MONEY TO GET MORE.: NEVER TRUE

## 2023-12-07 SDOH — ECONOMIC STABILITY: INCOME INSECURITY: HOW HARD IS IT FOR YOU TO PAY FOR THE VERY BASICS LIKE FOOD, HOUSING, MEDICAL CARE, AND HEATING?: NOT HARD AT ALL

## 2023-12-07 SDOH — ECONOMIC STABILITY: FOOD INSECURITY: WITHIN THE PAST 12 MONTHS, YOU WORRIED THAT YOUR FOOD WOULD RUN OUT BEFORE YOU GOT MONEY TO BUY MORE.: NEVER TRUE

## 2023-12-07 ASSESSMENT — ENCOUNTER SYMPTOMS
NAUSEA: 0
EYES NEGATIVE: 1
CHANGE IN BOWEL HABIT: 0
WATER BRASH: 0
VISUAL CHANGE: 0
SORE THROAT: 0
STRIDOR: 0
BELCHING: 0
ABDOMINAL PAIN: 1
HOARSE VOICE: 0
RESPIRATORY NEGATIVE: 1
VOMITING: 0
WHEEZING: 0
SWOLLEN GLANDS: 0
COUGH: 0
HEARTBURN: 1
ALLERGIC/IMMUNOLOGIC NEGATIVE: 1
CHOKING: 0
GLOBUS SENSATION: 0

## 2023-12-07 ASSESSMENT — PATIENT HEALTH QUESTIONNAIRE - PHQ9
1. LITTLE INTEREST OR PLEASURE IN DOING THINGS: 1
2. FEELING DOWN, DEPRESSED OR HOPELESS: 2
SUM OF ALL RESPONSES TO PHQ QUESTIONS 1-9: 8
SUM OF ALL RESPONSES TO PHQ QUESTIONS 1-9: 8
9. THOUGHTS THAT YOU WOULD BE BETTER OFF DEAD, OR OF HURTING YOURSELF: 0
8. MOVING OR SPEAKING SO SLOWLY THAT OTHER PEOPLE COULD HAVE NOTICED. OR THE OPPOSITE, BEING SO FIGETY OR RESTLESS THAT YOU HAVE BEEN MOVING AROUND A LOT MORE THAN USUAL: 0
SUM OF ALL RESPONSES TO PHQ QUESTIONS 1-9: 8
3. TROUBLE FALLING OR STAYING ASLEEP: 1
7. TROUBLE CONCENTRATING ON THINGS, SUCH AS READING THE NEWSPAPER OR WATCHING TELEVISION: 3
SUM OF ALL RESPONSES TO PHQ9 QUESTIONS 1 & 2: 3
10. IF YOU CHECKED OFF ANY PROBLEMS, HOW DIFFICULT HAVE THESE PROBLEMS MADE IT FOR YOU TO DO YOUR WORK, TAKE CARE OF THINGS AT HOME, OR GET ALONG WITH OTHER PEOPLE: 1
SUM OF ALL RESPONSES TO PHQ QUESTIONS 1-9: 8
5. POOR APPETITE OR OVEREATING: 0
6. FEELING BAD ABOUT YOURSELF - OR THAT YOU ARE A FAILURE OR HAVE LET YOURSELF OR YOUR FAMILY DOWN: 1
4. FEELING TIRED OR HAVING LITTLE ENERGY: 0

## 2023-12-07 NOTE — ASSESSMENT & PLAN NOTE
Uncontrolled, changes made today: declines medication - referred to [facility] Dr. Jun Lyles

## 2023-12-07 NOTE — ASSESSMENT & PLAN NOTE
Uncontrolled, changes made today: declines medication - referred to [facility] Dr. Radha Deleon

## 2023-12-26 ENCOUNTER — TELEPHONE (OUTPATIENT)
Dept: FAMILY MEDICINE CLINIC | Age: 38
End: 2023-12-26

## 2023-12-26 NOTE — TELEPHONE ENCOUNTER
Patient calling regarding paperwork she faxed over for her being off work and needs them filled out and faxed back to her work before 12/31. Please advise.

## 2023-12-26 NOTE — TELEPHONE ENCOUNTER
I was off last week so will be working on paperwork this week and will try to get it in before Dec 31st

## 2024-01-05 ENCOUNTER — OFFICE VISIT (OUTPATIENT)
Dept: FAMILY MEDICINE CLINIC | Age: 39
End: 2024-01-05
Payer: COMMERCIAL

## 2024-01-05 VITALS
HEART RATE: 109 BPM | SYSTOLIC BLOOD PRESSURE: 130 MMHG | DIASTOLIC BLOOD PRESSURE: 86 MMHG | OXYGEN SATURATION: 98 % | BODY MASS INDEX: 41.81 KG/M2 | WEIGHT: 275 LBS

## 2024-01-05 DIAGNOSIS — G24.3 SPASMODIC TORTICOLLIS: ICD-10-CM

## 2024-01-05 DIAGNOSIS — G47.30 BREATHING-RELATED SLEEP DISORDER: ICD-10-CM

## 2024-01-05 DIAGNOSIS — F39 MOOD DISORDER (HCC): ICD-10-CM

## 2024-01-05 DIAGNOSIS — F41.0 SEVERE ANXIETY WITH PANIC: Primary | ICD-10-CM

## 2024-01-05 PROCEDURE — 99214 OFFICE O/P EST MOD 30 MIN: CPT | Performed by: FAMILY MEDICINE

## 2024-01-05 ASSESSMENT — PATIENT HEALTH QUESTIONNAIRE - PHQ9
SUM OF ALL RESPONSES TO PHQ QUESTIONS 1-9: 0
SUM OF ALL RESPONSES TO PHQ QUESTIONS 1-9: 0
1. LITTLE INTEREST OR PLEASURE IN DOING THINGS: 0
SUM OF ALL RESPONSES TO PHQ QUESTIONS 1-9: 0
SUM OF ALL RESPONSES TO PHQ9 QUESTIONS 1 & 2: 0
SUM OF ALL RESPONSES TO PHQ QUESTIONS 1-9: 0
2. FEELING DOWN, DEPRESSED OR HOPELESS: 0

## 2024-01-05 ASSESSMENT — ENCOUNTER SYMPTOMS
SWOLLEN GLANDS: 0
RESPIRATORY NEGATIVE: 1
GASTROINTESTINAL NEGATIVE: 1
FEELING OF CHOKING: 0
VOMITING: 0
HYPERVENTILATION: 0
VISUAL CHANGE: 0
SHORTNESS OF BREATH: 0
CHANGE IN BOWEL HABIT: 0
EYES NEGATIVE: 1
ALLERGIC/IMMUNOLOGIC NEGATIVE: 1
NAUSEA: 0

## 2024-01-05 NOTE — PROGRESS NOTES
APSO Progress Note    Date:1/5/2024         Patient Name:Deisi Mora     YOB: 1985     Age:38 y.o.    Assessment/Plan        Problem List Items Addressed This Visit          Nervous and Auditory    Spasmodic torticollis      Borderline controlled, continue current medications and continue current treatment plan            Other    Mood disorder (HCC)      Borderline controlled, continue current treatment plan  Plans to return to work Monday  Has appointment with Dr. Zayra Randhawa on Friday         Severe anxiety with panic - Primary      Borderline controlled, continue current treatment plan  Plans to return to work Monday  Has appointment with Dr. Zayra Randhawa on Friday           Other Visit Diagnoses       Breathing-related sleep disorder        Relevant Orders    External Referral To Sleep Medicine             No follow-ups on file.    Electronically signed by Shabbir Luu DO on 1/5/24       Total time spent was between Time personally spent assessing and managing the patient on the date of service: Est: 30-39 minutes (88231) mins. This included time spent reviewing the patient's medical record (e.g., recent visits, labs, and studies); seeing the patient in the office (face-to-face time); ordering medications, studies, procedures, or referrals; calling the patient or family later in the day with results and further recommendations; and documenting the visit in the medical record.     Subjective     Deisi Mora is a 38 y.o. female presenting today for   Chief Complaint   Patient presents with    spasmodic torticollis   .    Doing a little better with her time off. Has appt with Dr. Iverson on Friday. Due to go back to work Monday and is worried about it.    Mood Swings  This is a recurrent problem. The current episode started 1 to 4 weeks ago. The problem occurs intermittently. The problem has been gradually worsening. Pertinent negatives include no anorexia, arthralgias, change in bowel

## 2024-01-05 NOTE — ASSESSMENT & PLAN NOTE
Borderline controlled, continue current treatment plan  Plans to return to work Monday  Has appointment with Dr. Zayra Randhawa on Friday

## 2024-01-05 NOTE — ASSESSMENT & PLAN NOTE
Borderline controlled, continue current treatment plan  Plans to return to work Monday  Has appointment with Dr. Zayra Radnhawa on Friday

## 2024-01-09 ASSESSMENT — LIFESTYLE VARIABLES
PAST THREE MONTHS WHAT IS THE LARGEST AMOUNT OF ALCOHOLIC DRINKS YOU HAVE CONSUMED IN ONE DAY: 3
ALCOHOL_DAYS_PER_WEEK: 2
HISTORY_ALCOHOL_USE: NO
HAVE YOU EVER RECEIVED ALCOHOL OR OTHER DRUG ABUSE TREATMENT: NO

## 2024-01-09 ASSESSMENT — ANXIETY QUESTIONNAIRES
2. NOT BEING ABLE TO STOP OR CONTROL WORRYING: NEARLY EVERY DAY
IF YOU CHECKED OFF ANY PROBLEMS ON THIS QUESTIONNAIRE, HOW DIFFICULT HAVE THESE PROBLEMS MADE IT FOR YOU TO DO YOUR WORK, TAKE CARE OF THINGS AT HOME, OR GET ALONG WITH OTHER PEOPLE: SOMEWHAT DIFFICULT
5. BEING SO RESTLESS THAT IT IS HARD TO SIT STILL: 1
IF YOU CHECKED OFF ANY PROBLEMS ON THIS QUESTIONNAIRE, HOW DIFFICULT HAVE THESE PROBLEMS MADE IT FOR YOU TO DO YOUR WORK, TAKE CARE OF THINGS AT HOME, OR GET ALONG WITH OTHER PEOPLE: SOMEWHAT DIFFICULT
6. BECOMING EASILY ANNOYED OR IRRITABLE: SEVERAL DAYS
4. TROUBLE RELAXING: NEARLY EVERY DAY
1. FEELING NERVOUS, ANXIOUS, OR ON EDGE: 2
7. FEELING AFRAID AS IF SOMETHING AWFUL MIGHT HAPPEN: 1
4. TROUBLE RELAXING: 3
1. FEELING NERVOUS, ANXIOUS, OR ON EDGE: MORE THAN HALF THE DAYS
GAD7 TOTAL SCORE: 14
2. NOT BEING ABLE TO STOP OR CONTROL WORRYING: 3
7. FEELING AFRAID AS IF SOMETHING AWFUL MIGHT HAPPEN: SEVERAL DAYS
3. WORRYING TOO MUCH ABOUT DIFFERENT THINGS: NEARLY EVERY DAY
5. BEING SO RESTLESS THAT IT IS HARD TO SIT STILL: SEVERAL DAYS
6. BECOMING EASILY ANNOYED OR IRRITABLE: 1
3. WORRYING TOO MUCH ABOUT DIFFERENT THINGS: 3

## 2024-01-11 ENCOUNTER — TELEMEDICINE (OUTPATIENT)
Dept: FAMILY MEDICINE CLINIC | Age: 39
End: 2024-01-11
Payer: COMMERCIAL

## 2024-01-11 DIAGNOSIS — F39 MOOD DISORDER (HCC): ICD-10-CM

## 2024-01-11 DIAGNOSIS — F41.0 SEVERE ANXIETY WITH PANIC: Primary | ICD-10-CM

## 2024-01-11 PROCEDURE — 99213 OFFICE O/P EST LOW 20 MIN: CPT | Performed by: FAMILY MEDICINE

## 2024-01-11 ASSESSMENT — ENCOUNTER SYMPTOMS
SHORTNESS OF BREATH: 0
VISUAL CHANGE: 0
FEELING OF CHOKING: 0
NAUSEA: 0
SWOLLEN GLANDS: 0
VOMITING: 0
HYPERVENTILATION: 0
CHANGE IN BOWEL HABIT: 0

## 2024-01-11 NOTE — PROGRESS NOTES
Normal    [] Abnormal -   Sclera  [x]  Normal    [] Abnormal -          Discharge [x]  None visible   [] Abnormal -     HENT: [x] Normocephalic, atraumatic  [] Abnormal -   [x] Mouth/Throat: Mucous membranes are moist    External Ears [x] Normal  [] Abnormal -    Neck: [x] No visualized mass [] Abnormal -     Pulmonary/Chest: [x] Respiratory effort normal   [x] No visualized signs of difficulty breathing or respiratory distress        [] Abnormal -      Musculoskeletal:   [x] Normal gait with no signs of ataxia         [x] Normal range of motion of neck        [] Abnormal -     Neurological:        [x] No Facial Asymmetry (Cranial nerve 7 motor function) (limited exam due to video visit)          [x] No gaze palsy        [] Abnormal -          Skin:        [x] No significant exanthematous lesions or discoloration noted on facial skin         [] Abnormal -            Psychiatric:       [x] Normal Affect [] Abnormal -        [x] No Hallucinations    Other pertinent observable physical exam findings:-         On this date 1/11/2024 I have spent 19 minutes reviewing previous notes, test results and face to face (virtual) with the patient discussing the diagnosis and importance of compliance with the treatment plan as well as documenting on the day of the visit.    --Shabbir Luu, DO

## 2024-01-11 NOTE — ASSESSMENT & PLAN NOTE
Borderline controlled, continue current treatment plan and lifestyle modifications recommended  Has CBT appt at  tomorrow  
 Borderline controlled, continue current treatment plan and lifestyle modifications recommended  Has CBT appt at  tomorrow   
No

## 2024-01-12 ENCOUNTER — OFFICE VISIT (OUTPATIENT)
Dept: BEHAVIORAL/MENTAL HEALTH CLINIC | Age: 39
End: 2024-01-12
Payer: COMMERCIAL

## 2024-01-12 DIAGNOSIS — F43.23 ADJUSTMENT DISORDER WITH MIXED ANXIETY AND DEPRESSED MOOD: Primary | ICD-10-CM

## 2024-01-12 PROCEDURE — 90791 PSYCH DIAGNOSTIC EVALUATION: CPT | Performed by: PSYCHOLOGIST

## 2024-01-12 NOTE — PROGRESS NOTES
ADULT BEHAVIORAL HEALTH ASSESSMENT  Zayra Randhawa,  Ph.D.   Licensed Clinical Psychologist ([id number])    Visit Date: 1/12/2024   Time of appointment:  9:10a - 10:25a   Time spent with Patient: 75 minutes.   This is patient's first appointment.    Reason for Consult:  Stress, Anxiety, and Depression     Referring Provider/PCP:    No ref. provider found  Shabbir Luu,       Pt provided informed consent for the behavioral health program. Discussed with patient model of service to include the limits of confidentiality (i.e. abuse reporting, suicide intervention, etc.) and short-term intervention focused approach.  Pt indicated understanding.    PRESENTING PROBLEM AND HISTORY  Deisi is a 38 y.o. female who presents for new evaluation and treatment of anxiety and depression.  She has the following symptoms: mild depressed mood, decreased sleep, feeling nervous, anxious, or on edge, racing worry thoughts, excessive anxiety and worry about specific stressors, and unexpected panic attacks and some mild irritability.  Onset of symptoms was approximately  4-5  months ago.  Symptoms have been gradually improving since that time.     Deisi reported that she has always been an emotionally sensitive person and prone to feeling easily anxious and worried, but it was never out of control except for a few times during her life.  She stated that some years ago, she struggled with panic attacks but was able to work through them by talking with a friend and practicing deep breathing and they eventually stopped occurring after several months.  She reported that her current symptoms started around September 2023 after finding out that her beloved boss was planning to move up his shelter to November.  She stated that her boss was the person who hired her 13 years ago at the [place] location of her company and he has always been extremely supportive and understanding towards her.  She stated, \"He's like a dad to me.\"  She

## 2024-01-23 DIAGNOSIS — G25.2 INTENTION TREMOR: ICD-10-CM

## 2024-01-23 DIAGNOSIS — G43.909 MIGRAINE WITHOUT STATUS MIGRAINOSUS, NOT INTRACTABLE, UNSPECIFIED MIGRAINE TYPE: ICD-10-CM

## 2024-01-24 RX ORDER — TOPIRAMATE 100 MG/1
1 CAPSULE, EXTENDED RELEASE ORAL NIGHTLY
Qty: 90 CAPSULE | Refills: 3 | Status: SHIPPED | OUTPATIENT
Start: 2024-01-24

## 2024-01-24 NOTE — TELEPHONE ENCOUNTER
Deisi Mora is calling to request a refill on the following medication(s):    Last Visit Date (If Applicable):  1/11/2024    Next Visit Date:    Visit date not found    Medication Request:  Requested Prescriptions     Pending Prescriptions Disp Refills    TROKENDI  MG CP24 [Pharmacy Med Name: TROKENDI  MG CAPSULE] 90 capsule 0     Sig: take 1 capsule by mouth nightly

## 2024-01-26 ENCOUNTER — TELEPHONE (OUTPATIENT)
Dept: FAMILY MEDICINE CLINIC | Age: 39
End: 2024-01-26

## 2024-02-02 ENCOUNTER — OFFICE VISIT (OUTPATIENT)
Dept: BEHAVIORAL/MENTAL HEALTH CLINIC | Age: 39
End: 2024-02-02
Payer: COMMERCIAL

## 2024-02-02 DIAGNOSIS — F43.23 ADJUSTMENT DISORDER WITH MIXED ANXIETY AND DEPRESSED MOOD: Primary | ICD-10-CM

## 2024-02-02 PROCEDURE — 90837 PSYTX W PT 60 MINUTES: CPT | Performed by: PSYCHOLOGIST

## 2024-02-02 NOTE — PROGRESS NOTES
ADULT BEHAVIORAL HEALTH ASSESSMENT  Zayra Randhawa,  Ph.D.   Licensed Clinical Psychologist ([id number])    Visit Date: 2/2/2024   Time of appointment:  3:02p - 4:04p   Time spent with Patient: 62 minutes.   This is patient's second appointment.    Reason for Consult:  Stress, Anxiety, and Depression     Referring Provider/PCP:    No ref. provider found  Shabbir Luu,       Pt provided informed consent for the behavioral health program. Discussed with patient model of service to include the limits of confidentiality (i.e. abuse reporting, suicide intervention, etc.) and short-term intervention focused approach.  Pt indicated understanding.    SUBJECTIVE  Deisi is a 38 y.o. female who presents for follow up of anxiety and depression.  She reported that she has continued to be very stressed by issues going on at work since she returned.  In particular, she noted that she has been having trouble getting her short-term disability for her time off work in December to be approved.  She brought in a recent letter denying her leave paperwork, which had been originally completed by her PCP.  She reviewed the letter in session with the [facility].  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  She reported that everyone warned her that this person was \"a bitch\" and even her boss had difficulty with this person.  Deisi reported that her boss called her yesterday about a potential job opportunity because he knows how unhappy she has been in her current job and he sent her the job description.  She stated that she talked it over with her  in addition to consulting with her old boss and they both encouraged her to check it out and apply for it.  She reported that she feels excited about this potential job opportunity.  Deisi reported that her sleep has improved over the past few weeks, which she feels

## 2024-02-23 ENCOUNTER — OFFICE VISIT (OUTPATIENT)
Dept: BEHAVIORAL/MENTAL HEALTH CLINIC | Age: 39
End: 2024-02-23
Payer: COMMERCIAL

## 2024-02-23 DIAGNOSIS — F43.23 ADJUSTMENT DISORDER WITH MIXED ANXIETY AND DEPRESSED MOOD: Primary | ICD-10-CM

## 2024-02-23 PROCEDURE — 90837 PSYTX W PT 60 MINUTES: CPT | Performed by: PSYCHOLOGIST

## 2024-02-23 NOTE — PROGRESS NOTES
ADULT BEHAVIORAL HEALTH ASSESSMENT  Zayra Randhawa,  Ph.D.   Licensed Clinical Psychologist ([id number])    Visit Date: 2/23/2024   Time of appointment:  10:15a - 11:09a   Time spent with Patient: 54 minutes.   This is patient's third appointment.    Reason for Consult:  Stress, Anxiety, and Depression     Referring Provider/PCP:    No ref. provider found  Shabbir Luu,       Pt provided informed consent for the behavioral health program. Discussed with patient model of service to include the limits of confidentiality (i.e. abuse reporting, suicide intervention, etc.) and short-term intervention focused approach.  Pt indicated understanding.    SUBJECTIVE  Deisi is a 38 y.o. female who presents for follow up of anxiety and depression.  She reported that she saw a new neurologist last week on 2/15/24, whom she stated that she \"absolutely loved.\"  She reported that the neurologist prescribed magnesium oxide and riboflavin (B2) to help with sleep, which she started right away.  She reported that she noticed improvement even on the first night and her sleep has \"never been better\" after years of struggling with sleep.  She reported that the new neurologist also got her connected with another neurologist who can do botox injections for the torticollis in her neck and she saw that provider on 2/21.  Deisi reported that she can tell the improved sleep has helped her mood, but work has become extremely stressful again over the past few weeks.  She described some recent issues she has been having at work.  She stated that she has not been able to find out further about a job opportunity her new boss suggested because he asked her to wait until he talks to their  about it.  She stated that she has reminded her boss a couple times, but he has not done anything.  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  She

## 2024-04-02 ENCOUNTER — OFFICE VISIT (OUTPATIENT)
Dept: BEHAVIORAL/MENTAL HEALTH CLINIC | Age: 39
End: 2024-04-02
Payer: COMMERCIAL

## 2024-04-02 DIAGNOSIS — F43.23 ADJUSTMENT DISORDER WITH MIXED ANXIETY AND DEPRESSED MOOD: Primary | ICD-10-CM

## 2024-04-02 PROCEDURE — 90837 PSYTX W PT 60 MINUTES: CPT | Performed by: PSYCHOLOGIST

## 2024-04-02 ASSESSMENT — PATIENT HEALTH QUESTIONNAIRE - PHQ9
2. FEELING DOWN, DEPRESSED OR HOPELESS: SEVERAL DAYS
8. MOVING OR SPEAKING SO SLOWLY THAT OTHER PEOPLE COULD HAVE NOTICED. OR THE OPPOSITE, BEING SO FIGETY OR RESTLESS THAT YOU HAVE BEEN MOVING AROUND A LOT MORE THAN USUAL: SEVERAL DAYS
SUM OF ALL RESPONSES TO PHQ QUESTIONS 1-9: 10
4. FEELING TIRED OR HAVING LITTLE ENERGY: SEVERAL DAYS
9. THOUGHTS THAT YOU WOULD BE BETTER OFF DEAD, OR OF HURTING YOURSELF: NOT AT ALL
6. FEELING BAD ABOUT YOURSELF - OR THAT YOU ARE A FAILURE OR HAVE LET YOURSELF OR YOUR FAMILY DOWN: NOT AT ALL
SUM OF ALL RESPONSES TO PHQ QUESTIONS 1-9: 10
5. POOR APPETITE OR OVEREATING: NEARLY EVERY DAY
3. TROUBLE FALLING OR STAYING ASLEEP: MORE THAN HALF THE DAYS
10. IF YOU CHECKED OFF ANY PROBLEMS, HOW DIFFICULT HAVE THESE PROBLEMS MADE IT FOR YOU TO DO YOUR WORK, TAKE CARE OF THINGS AT HOME, OR GET ALONG WITH OTHER PEOPLE: NOT DIFFICULT AT ALL
1. LITTLE INTEREST OR PLEASURE IN DOING THINGS: NOT AT ALL
SUM OF ALL RESPONSES TO PHQ QUESTIONS 1-9: 10
7. TROUBLE CONCENTRATING ON THINGS, SUCH AS READING THE NEWSPAPER OR WATCHING TELEVISION: MORE THAN HALF THE DAYS
SUM OF ALL RESPONSES TO PHQ QUESTIONS 1-9: 10
SUM OF ALL RESPONSES TO PHQ9 QUESTIONS 1 & 2: 1

## 2024-04-02 NOTE — PROGRESS NOTES
ADULT BEHAVIORAL HEALTH ASSESSMENT  Zayra Randhawa,  Ph.D.   Licensed Clinical Psychologist ([id number])    Visit Date: 4/2/2024   Time of appointment:  4:02p - 5:05p   Time spent with Patient: 63 minutes.   This is patient's fourth appointment.    Reason for Consult:  Stress, Anxiety, and Depression     Referring Provider/PCP:    No ref. provider found  Shabbir Luu,       Pt provided informed consent for the behavioral health program. Discussed with patient model of service to include the limits of confidentiality (i.e. abuse reporting, suicide intervention, etc.) and short-term intervention focused approach.  Pt indicated understanding.    SUBJECTIVE  Deisi is a 38 y.o. female who presents for follow up of anxiety and depression.  She reported that the past few weeks have been particularly stressful primarily due to work.  She reported that she has been working 60+ hour weeks more often last month and had to do this the week before her vacation and the week after.  She stated that thankfully, she did enjoy her 1 week vacation off.  She stated that even people at work made comments last week that she looked even more \"burnt out\" than usual.  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  She stated that after work that day, she called her old boss to tell him what people were saying to her, but he cautioned her not to quit her job until she has another job first.  She stated that last Friday, another  made a comment about her looking tired and she half-jokingly asked if he has any jobs open for her to take.  She reported that he told her they just posted a new job opening and described the duties and likely pay and told her that it would only be Monday to Friday 9AM to 5PM and no long hours.  Deisi reported that she was so

## 2024-05-28 ENCOUNTER — OFFICE VISIT (OUTPATIENT)
Dept: BEHAVIORAL/MENTAL HEALTH CLINIC | Age: 39
End: 2024-05-28
Payer: COMMERCIAL

## 2024-05-28 DIAGNOSIS — F43.23 ADJUSTMENT DISORDER WITH MIXED ANXIETY AND DEPRESSED MOOD: Primary | ICD-10-CM

## 2024-05-28 PROCEDURE — 90837 PSYTX W PT 60 MINUTES: CPT | Performed by: PSYCHOLOGIST

## 2024-05-28 ASSESSMENT — PATIENT HEALTH QUESTIONNAIRE - PHQ9
8. MOVING OR SPEAKING SO SLOWLY THAT OTHER PEOPLE COULD HAVE NOTICED. OR THE OPPOSITE, BEING SO FIGETY OR RESTLESS THAT YOU HAVE BEEN MOVING AROUND A LOT MORE THAN USUAL: NOT AT ALL
SUM OF ALL RESPONSES TO PHQ9 QUESTIONS 1 & 2: 0
SUM OF ALL RESPONSES TO PHQ QUESTIONS 1-9: 2
9. THOUGHTS THAT YOU WOULD BE BETTER OFF DEAD, OR OF HURTING YOURSELF: NOT AT ALL
10. IF YOU CHECKED OFF ANY PROBLEMS, HOW DIFFICULT HAVE THESE PROBLEMS MADE IT FOR YOU TO DO YOUR WORK, TAKE CARE OF THINGS AT HOME, OR GET ALONG WITH OTHER PEOPLE: NOT DIFFICULT AT ALL
7. TROUBLE CONCENTRATING ON THINGS, SUCH AS READING THE NEWSPAPER OR WATCHING TELEVISION: NOT AT ALL
2. FEELING DOWN, DEPRESSED OR HOPELESS: NOT AT ALL
SUM OF ALL RESPONSES TO PHQ QUESTIONS 1-9: 2
3. TROUBLE FALLING OR STAYING ASLEEP: SEVERAL DAYS
4. FEELING TIRED OR HAVING LITTLE ENERGY: SEVERAL DAYS
6. FEELING BAD ABOUT YOURSELF - OR THAT YOU ARE A FAILURE OR HAVE LET YOURSELF OR YOUR FAMILY DOWN: NOT AT ALL
5. POOR APPETITE OR OVEREATING: NOT AT ALL
1. LITTLE INTEREST OR PLEASURE IN DOING THINGS: NOT AT ALL

## 2024-05-28 NOTE — PROGRESS NOTES
ADULT BEHAVIORAL HEALTH ASSESSMENT  Zayra Randhawa,  Ph.D.   Licensed Clinical Psychologist ([id number])    Visit Date: 5/28/2024   Time of appointment:  4:05p - 5:05p   Time spent with Patient: 60 minutes.   This is patient's fifth appointment.    Reason for Consult:  Anxiety and Depression     Referring Provider/PCP:    No ref. provider found  Shabbir Luu,       Pt provided informed consent for the behavioral health program. Discussed with patient model of service to include the limits of confidentiality (i.e. abuse reporting, suicide intervention, etc.) and short-term intervention focused approach.  Pt indicated understanding.    JESSICA Lipscomb is a 38 y.o. female who presents for follow up of anxiety and depression.  She was last seen several weeks ago on 4/2/24 as she had to cancel her last scheduled appointment due to a change in her work schedule.  She reported that she has been feeling significantly less emotionally distressed since she left her job with [employer] on 4/17/24 and started a new job with [employer]  on 4/22/24.  She described having somewhat of a \"miguel start\" at the new job and some other challenges over the past few weeks, but overall, she stated that she is \"so much happier\" with her new job.  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  She described what she has been learning to do and shared pictures of what she has been making, stating, \"I'm so proud of what we make.\"  She also described the people she has been working with and despite some difficult co-workers, she has been generally getting along well with most of them and enjoying the work environment.  She reported that she still talks with old co-workers from [employer] and they have been telling her that it has been getting worse there, so she stated, \"I'm just really glad I

## 2024-07-09 ENCOUNTER — OFFICE VISIT (OUTPATIENT)
Dept: BEHAVIORAL/MENTAL HEALTH CLINIC | Age: 39
End: 2024-07-09
Payer: COMMERCIAL

## 2024-07-09 DIAGNOSIS — F43.23 ADJUSTMENT DISORDER WITH MIXED ANXIETY AND DEPRESSED MOOD: Primary | ICD-10-CM

## 2024-07-09 PROCEDURE — 90837 PSYTX W PT 60 MINUTES: CPT | Performed by: PSYCHOLOGIST

## 2024-07-09 ASSESSMENT — PATIENT HEALTH QUESTIONNAIRE - PHQ9
SUM OF ALL RESPONSES TO PHQ QUESTIONS 1-9: 4
SUM OF ALL RESPONSES TO PHQ QUESTIONS 1-9: 4
1. LITTLE INTEREST OR PLEASURE IN DOING THINGS: NOT AT ALL
3. TROUBLE FALLING OR STAYING ASLEEP: NEARLY EVERY DAY
SUM OF ALL RESPONSES TO PHQ QUESTIONS 1-9: 4
7. TROUBLE CONCENTRATING ON THINGS, SUCH AS READING THE NEWSPAPER OR WATCHING TELEVISION: NOT AT ALL
4. FEELING TIRED OR HAVING LITTLE ENERGY: SEVERAL DAYS
2. FEELING DOWN, DEPRESSED OR HOPELESS: NOT AT ALL
10. IF YOU CHECKED OFF ANY PROBLEMS, HOW DIFFICULT HAVE THESE PROBLEMS MADE IT FOR YOU TO DO YOUR WORK, TAKE CARE OF THINGS AT HOME, OR GET ALONG WITH OTHER PEOPLE: NOT DIFFICULT AT ALL
SUM OF ALL RESPONSES TO PHQ9 QUESTIONS 1 & 2: 0
8. MOVING OR SPEAKING SO SLOWLY THAT OTHER PEOPLE COULD HAVE NOTICED. OR THE OPPOSITE, BEING SO FIGETY OR RESTLESS THAT YOU HAVE BEEN MOVING AROUND A LOT MORE THAN USUAL: NOT AT ALL
9. THOUGHTS THAT YOU WOULD BE BETTER OFF DEAD, OR OF HURTING YOURSELF: NOT AT ALL
5. POOR APPETITE OR OVEREATING: NOT AT ALL
6. FEELING BAD ABOUT YOURSELF - OR THAT YOU ARE A FAILURE OR HAVE LET YOURSELF OR YOUR FAMILY DOWN: NOT AT ALL
SUM OF ALL RESPONSES TO PHQ QUESTIONS 1-9: 4

## 2024-07-31 ENCOUNTER — HOSPITAL ENCOUNTER (OUTPATIENT)
Facility: CLINIC | Age: 39
Discharge: HOME | End: 2024-07-31
Payer: COMMERCIAL

## 2024-07-31 LAB
ALBUMIN SERPL-MCNC: 4.4 G/DL (ref 3.5–5.2)
ALBUMIN/GLOB SERPL: 2 {RATIO} (ref 1–2.5)
ALP SERPL-CCNC: 74 U/L (ref 35–104)
ALT SERPL-CCNC: 17 U/L (ref 10–35)
ANION GAP SERPL CALCULATED.3IONS-SCNC: 9 MMOL/L (ref 9–16)
AST SERPL-CCNC: 19 U/L (ref 10–35)
BILIRUB SERPL-MCNC: 0.4 MG/DL (ref 0–1.2)
BUN SERPL-MCNC: 14 MG/DL (ref 6–20)
CALCIUM SERPL-MCNC: 9.2 MG/DL (ref 8.6–10.4)
CHLORIDE SERPL-SCNC: 107 MMOL/L (ref 98–107)
CO2 SERPL-SCNC: 23 MMOL/L (ref 20–31)
CREAT SERPL-MCNC: 0.9 MG/DL (ref 0.5–0.9)
GFR, ESTIMATED: 80 ML/MIN/1.73M2
GLUCOSE P FAST SERPL-MCNC: 89 MG/DL (ref 74–99)
POTASSIUM SERPL-SCNC: 4.5 MMOL/L (ref 3.7–5.3)
PROT SERPL-MCNC: 6.8 G/DL (ref 6.6–8.7)
SODIUM SERPL-SCNC: 139 MMOL/L (ref 136–145)

## 2024-07-31 PROCEDURE — 80053 COMPREHEN METABOLIC PANEL: CPT

## 2024-07-31 PROCEDURE — 36415 COLL VENOUS BLD VENIPUNCTURE: CPT

## 2024-08-20 ENCOUNTER — OFFICE VISIT (OUTPATIENT)
Dept: BEHAVIORAL/MENTAL HEALTH CLINIC | Age: 39
End: 2024-08-20
Payer: COMMERCIAL

## 2024-08-20 ENCOUNTER — TELEPHONE (OUTPATIENT)
Dept: FAMILY MEDICINE CLINIC | Age: 39
End: 2024-08-20

## 2024-08-20 DIAGNOSIS — F43.23 ADJUSTMENT DISORDER WITH MIXED ANXIETY AND DEPRESSED MOOD: Primary | ICD-10-CM

## 2024-08-20 PROCEDURE — 90837 PSYTX W PT 60 MINUTES: CPT | Performed by: PSYCHOLOGIST

## 2024-08-20 ASSESSMENT — PATIENT HEALTH QUESTIONNAIRE - PHQ9
6. FEELING BAD ABOUT YOURSELF - OR THAT YOU ARE A FAILURE OR HAVE LET YOURSELF OR YOUR FAMILY DOWN: NOT AT ALL
2. FEELING DOWN, DEPRESSED OR HOPELESS: NOT AT ALL
SUM OF ALL RESPONSES TO PHQ QUESTIONS 1-9: 0
8. MOVING OR SPEAKING SO SLOWLY THAT OTHER PEOPLE COULD HAVE NOTICED. OR THE OPPOSITE, BEING SO FIGETY OR RESTLESS THAT YOU HAVE BEEN MOVING AROUND A LOT MORE THAN USUAL: NOT AT ALL
7. TROUBLE CONCENTRATING ON THINGS, SUCH AS READING THE NEWSPAPER OR WATCHING TELEVISION: NOT AT ALL
SUM OF ALL RESPONSES TO PHQ QUESTIONS 1-9: 0
3. TROUBLE FALLING OR STAYING ASLEEP: NOT AT ALL
9. THOUGHTS THAT YOU WOULD BE BETTER OFF DEAD, OR OF HURTING YOURSELF: NOT AT ALL
5. POOR APPETITE OR OVEREATING: NOT AT ALL
SUM OF ALL RESPONSES TO PHQ QUESTIONS 1-9: 0
SUM OF ALL RESPONSES TO PHQ9 QUESTIONS 1 & 2: 0
SUM OF ALL RESPONSES TO PHQ QUESTIONS 1-9: 0
4. FEELING TIRED OR HAVING LITTLE ENERGY: NOT AT ALL
1. LITTLE INTEREST OR PLEASURE IN DOING THINGS: NOT AT ALL
10. IF YOU CHECKED OFF ANY PROBLEMS, HOW DIFFICULT HAVE THESE PROBLEMS MADE IT FOR YOU TO DO YOUR WORK, TAKE CARE OF THINGS AT HOME, OR GET ALONG WITH OTHER PEOPLE: NOT DIFFICULT AT ALL

## 2024-08-20 NOTE — TELEPHONE ENCOUNTER
Rolly Coburn from [facility] calling to confirm information because patient is receiving the same script from two providers. Dr. Vika Duckworth started prescribing the dose of 150.   Are you aware that she is now getting 150 and do you want to let Dr. Duckworth take over this drug?     Please [phone]

## 2024-08-24 ENCOUNTER — PATIENT MESSAGE (OUTPATIENT)
Dept: FAMILY MEDICINE CLINIC | Age: 39
End: 2024-08-24

## 2024-08-27 NOTE — TELEPHONE ENCOUNTER
Please call Deisi and have her make an appointment to come be seen tomorrow or Thursday by any provider with an opening. Thank you

## 2024-08-29 ENCOUNTER — HOSPITAL ENCOUNTER (OUTPATIENT)
Dept: GENERAL RADIOLOGY | Age: 39
Discharge: HOME | End: 2024-08-31
Payer: COMMERCIAL

## 2024-08-29 ENCOUNTER — HOSPITAL ENCOUNTER (OUTPATIENT)
Age: 39
Discharge: HOME | End: 2024-08-31
Payer: COMMERCIAL

## 2024-08-29 ENCOUNTER — OFFICE VISIT (OUTPATIENT)
Dept: FAMILY MEDICINE CLINIC | Age: 39
End: 2024-08-29
Payer: COMMERCIAL

## 2024-08-29 VITALS
SYSTOLIC BLOOD PRESSURE: 124 MMHG | TEMPERATURE: 97.3 F | OXYGEN SATURATION: 96 % | DIASTOLIC BLOOD PRESSURE: 80 MMHG | HEART RATE: 89 BPM | BODY MASS INDEX: 39.68 KG/M2 | WEIGHT: 261 LBS

## 2024-08-29 DIAGNOSIS — J40 BRONCHITIS: ICD-10-CM

## 2024-08-29 DIAGNOSIS — J40 BRONCHITIS: Primary | ICD-10-CM

## 2024-08-29 PROCEDURE — 71046 X-RAY EXAM CHEST 2 VIEWS: CPT

## 2024-08-29 PROCEDURE — 99214 OFFICE O/P EST MOD 30 MIN: CPT | Performed by: NURSE PRACTITIONER

## 2024-08-29 RX ORDER — TOPIRAMATE 50 MG/1
100 CAPSULE, EXTENDED RELEASE ORAL
COMMUNITY
Start: 2024-07-02

## 2024-08-29 RX ORDER — ALBUTEROL SULFATE 90 UG/1
2 AEROSOL, METERED RESPIRATORY (INHALATION) 4 TIMES DAILY PRN
Qty: 54 G | Refills: 1 | Status: SHIPPED | OUTPATIENT
Start: 2024-08-29

## 2024-08-29 RX ORDER — TOPIRAMATE 50 MG/1
CAPSULE, EXTENDED RELEASE ORAL
COMMUNITY
Start: 2024-07-26

## 2024-08-29 RX ORDER — METHYLPREDNISOLONE 4 MG
TABLET, DOSE PACK ORAL
Qty: 1 KIT | Refills: 0 | Status: SHIPPED | OUTPATIENT
Start: 2024-08-29 | End: 2024-09-04

## 2024-08-29 RX ORDER — BACLOFEN 5 MG/1
5 TABLET ORAL 3 TIMES DAILY PRN
COMMUNITY
Start: 2024-06-15

## 2024-08-29 RX ORDER — RIBOFLAVIN (VITAMIN B2) 100 MG
1 TABLET ORAL DAILY
COMMUNITY
Start: 2024-05-21

## 2024-08-29 ASSESSMENT — ENCOUNTER SYMPTOMS
SINUS PRESSURE: 0
SHORTNESS OF BREATH: 0
COUGH: 1
SORE THROAT: 0
VOICE CHANGE: 1
CHEST TIGHTNESS: 0
WHEEZING: 1

## 2024-08-29 ASSESSMENT — PATIENT HEALTH QUESTIONNAIRE - PHQ9
SUM OF ALL RESPONSES TO PHQ QUESTIONS 1-9: 0
SUM OF ALL RESPONSES TO PHQ QUESTIONS 1-9: 0
SUM OF ALL RESPONSES TO PHQ9 QUESTIONS 1 & 2: 0
SUM OF ALL RESPONSES TO PHQ QUESTIONS 1-9: 0
SUM OF ALL RESPONSES TO PHQ QUESTIONS 1-9: 0
1. LITTLE INTEREST OR PLEASURE IN DOING THINGS: NOT AT ALL
2. FEELING DOWN, DEPRESSED OR HOPELESS: NOT AT ALL

## 2024-09-04 ENCOUNTER — OFFICE VISIT (OUTPATIENT)
Dept: FAMILY MEDICINE CLINIC | Age: 39
End: 2024-09-04
Payer: COMMERCIAL

## 2024-09-04 VITALS
WEIGHT: 258 LBS | OXYGEN SATURATION: 100 % | HEART RATE: 84 BPM | DIASTOLIC BLOOD PRESSURE: 82 MMHG | SYSTOLIC BLOOD PRESSURE: 128 MMHG | BODY MASS INDEX: 39.23 KG/M2

## 2024-09-04 DIAGNOSIS — J12.9 VIRAL PNEUMONIA: Primary | ICD-10-CM

## 2024-09-04 PROCEDURE — 99213 OFFICE O/P EST LOW 20 MIN: CPT | Performed by: FAMILY MEDICINE

## 2024-09-04 ASSESSMENT — ENCOUNTER SYMPTOMS
RHINORRHEA: 1
WHEEZING: 0
SORE THROAT: 0
COUGH: 1
HEARTBURN: 0
SHORTNESS OF BREATH: 0
HEMOPTYSIS: 0

## 2024-09-04 NOTE — PROGRESS NOTES
[facility] Progress Note    Date:9/4/2024         Patient Name:Deisi Moar     YOB: 1985     Age:38 y.o.    Assessment/Plan        Problem List Items Addressed This Visit    None  Visit Diagnoses       Viral pneumonia    -  Primary    Finishing steroid pack with helped  Albuterol prn  CXR negative             Return in 8 weeks (on 10/30/2024), or if symptoms worsen or fail to improve.    Electronically signed by Shabbir Luu DO on 9/4/24       Total time spent was between Time personally spent assessing and managing the patient on the date of service: Est: 20-29 minutes (24595) mins. This included time spent reviewing the patient's medical record (e.g., recent visits, labs, and studies); seeing the patient in the office (face-to-face time); ordering medications, studies, procedures, or referrals; calling the patient or family later in the day with results and further recommendations; and documenting the visit in the medical record.     Subjective     Deisi Mora is a 38 y.o. female presenting today for   Chief Complaint   Patient presents with    Cough   .    Cough  This is a new problem. The current episode started in the past 7 days. The problem has been gradually improving. The problem occurs every few hours. The cough is Non-productive. Associated symptoms include rhinorrhea. Pertinent negatives include no chest pain, chills, ear congestion, ear pain, fever, headaches, heartburn, hemoptysis, myalgias, nasal congestion, postnasal drip, rash, sore throat, shortness of breath, sweats, weight loss or wheezing. Associated symptoms comments: Hoarse voice. She has tried oral steroids and a beta-agonist inhaler for the symptoms. The treatment provided significant relief.       Review of Systems   Review of Systems   Constitutional:  Negative for chills, fever and weight loss.   HENT:  Positive for rhinorrhea. Negative for ear pain, postnasal drip and sore throat.    Respiratory:  Positive for

## 2024-10-08 ENCOUNTER — OFFICE VISIT (OUTPATIENT)
Dept: FAMILY MEDICINE CLINIC | Age: 39
End: 2024-10-08
Payer: COMMERCIAL

## 2024-10-08 VITALS
BODY MASS INDEX: 39.23 KG/M2 | SYSTOLIC BLOOD PRESSURE: 124 MMHG | HEART RATE: 80 BPM | DIASTOLIC BLOOD PRESSURE: 72 MMHG | WEIGHT: 258 LBS | OXYGEN SATURATION: 98 %

## 2024-10-08 DIAGNOSIS — G43.909 MIGRAINE WITHOUT STATUS MIGRAINOSUS, NOT INTRACTABLE, UNSPECIFIED MIGRAINE TYPE: Primary | ICD-10-CM

## 2024-10-08 DIAGNOSIS — G24.3 SPASMODIC TORTICOLLIS: ICD-10-CM

## 2024-10-08 DIAGNOSIS — F39 MOOD DISORDER (HCC): ICD-10-CM

## 2024-10-08 DIAGNOSIS — F41.0 SEVERE ANXIETY WITH PANIC: ICD-10-CM

## 2024-10-08 DIAGNOSIS — R49.0 HOARSE VOICE QUALITY: ICD-10-CM

## 2024-10-08 PROCEDURE — 99214 OFFICE O/P EST MOD 30 MIN: CPT | Performed by: FAMILY MEDICINE

## 2024-10-08 ASSESSMENT — ENCOUNTER SYMPTOMS
VISUAL CHANGE: 0
EYES NEGATIVE: 1
FEELING OF CHOKING: 0
GASTROINTESTINAL NEGATIVE: 1
RESPIRATORY NEGATIVE: 1
VOMITING: 0
SWOLLEN GLANDS: 0
NAUSEA: 0
HYPERVENTILATION: 0
SHORTNESS OF BREATH: 0
ALLERGIC/IMMUNOLOGIC NEGATIVE: 1
CHANGE IN BOWEL HABIT: 0

## 2024-10-08 NOTE — PROGRESS NOTES
discharge.      Extraocular Movements: Extraocular movements intact.      Conjunctiva/sclera: Conjunctivae normal.   Cardiovascular:      Rate and Rhythm: Normal rate and regular rhythm.      Pulses: Normal pulses.      Heart sounds: Normal heart sounds. No murmur heard.     No friction rub. No gallop.   Pulmonary:      Effort: Pulmonary effort is normal. No respiratory distress.      Breath sounds: Normal breath sounds. No stridor. No wheezing, rhonchi or rales.   Chest:      Chest wall: No tenderness.   Neurological:      Mental Status: She is alert and oriented to person, place, and time. Mental status is at baseline.   Psychiatric:         Mood and Affect: Mood normal.         Behavior: Behavior normal.         Thought Content: Thought content normal.         Judgment: Judgment normal.         Labs/Imaging/Diagnostics   Labs:  Hemoglobin A1C   Date Value Ref Range Status   12/05/2023 5.1 4.0 - 6.0 % Final       Imaging Last 24 Hours:  XR CHEST (2 VW)  Narrative: EXAMINATION:  TWO XRAY VIEWS OF THE CHEST    8/29/2024 3:58 pm    COMPARISON:  None.    HISTORY:  ORDERING SYSTEM PROVIDED HISTORY: Bronchitis  TECHNOLOGIST PROVIDED HISTORY:  Reason for Exam: Pt states cough, sob x 3 weeks. Possible bronchitis.    FINDINGS:  The lungs are without acute focal process.  There is no effusion or  pneumothorax. The cardiomediastinal silhouette is without acute process. The  osseous structures are without acute process.  Impression: No acute process.

## 2024-10-29 ENCOUNTER — OFFICE VISIT (OUTPATIENT)
Dept: BEHAVIORAL/MENTAL HEALTH CLINIC | Age: 39
End: 2024-10-29
Payer: COMMERCIAL

## 2024-10-29 DIAGNOSIS — F43.23 ADJUSTMENT DISORDER WITH MIXED ANXIETY AND DEPRESSED MOOD: Primary | ICD-10-CM

## 2024-10-29 PROCEDURE — 90837 PSYTX W PT 60 MINUTES: CPT | Performed by: PSYCHOLOGIST

## 2024-10-29 ASSESSMENT — PATIENT HEALTH QUESTIONNAIRE - PHQ9
SUM OF ALL RESPONSES TO PHQ QUESTIONS 1-9: 0
7. TROUBLE CONCENTRATING ON THINGS, SUCH AS READING THE NEWSPAPER OR WATCHING TELEVISION: NOT AT ALL
3. TROUBLE FALLING OR STAYING ASLEEP: NOT AT ALL
4. FEELING TIRED OR HAVING LITTLE ENERGY: NOT AT ALL
5. POOR APPETITE OR OVEREATING: NOT AT ALL
6. FEELING BAD ABOUT YOURSELF - OR THAT YOU ARE A FAILURE OR HAVE LET YOURSELF OR YOUR FAMILY DOWN: NOT AT ALL
SUM OF ALL RESPONSES TO PHQ9 QUESTIONS 1 & 2: 0
9. THOUGHTS THAT YOU WOULD BE BETTER OFF DEAD, OR OF HURTING YOURSELF: NOT AT ALL
8. MOVING OR SPEAKING SO SLOWLY THAT OTHER PEOPLE COULD HAVE NOTICED. OR THE OPPOSITE, BEING SO FIGETY OR RESTLESS THAT YOU HAVE BEEN MOVING AROUND A LOT MORE THAN USUAL: NOT AT ALL
SUM OF ALL RESPONSES TO PHQ QUESTIONS 1-9: 0
1. LITTLE INTEREST OR PLEASURE IN DOING THINGS: NOT AT ALL
10. IF YOU CHECKED OFF ANY PROBLEMS, HOW DIFFICULT HAVE THESE PROBLEMS MADE IT FOR YOU TO DO YOUR WORK, TAKE CARE OF THINGS AT HOME, OR GET ALONG WITH OTHER PEOPLE: NOT DIFFICULT AT ALL
SUM OF ALL RESPONSES TO PHQ QUESTIONS 1-9: 0
SUM OF ALL RESPONSES TO PHQ QUESTIONS 1-9: 0
2. FEELING DOWN, DEPRESSED OR HOPELESS: NOT AT ALL

## 2024-10-29 NOTE — PROGRESS NOTES
ADULT BEHAVIORAL HEALTH ASSESSMENT  Zayra Randhawa,  Ph.D.   Licensed Clinical Psychologist ([id number])    Visit Date: 10/29/2024   Time of appointment:  4:06p - 5:10p   Time spent with Patient: 64 minutes.   This is patient's eighth appointment.    Reason for Consult:  Anxiety and Depression     Referring Provider/PCP:    No ref. provider found  Shabbir Luu,       Pt provided informed consent for the behavioral health program. Discussed with patient model of service to include the limits of confidentiality (i.e. abuse reporting, suicide intervention, etc.) and short-term intervention focused approach.  Pt indicated understanding.    SUBJECTIVE  Deisi is a 39 y.o. female who presents for follow up of anxiety and depression.  She was last seen several weeks ago on 8/20/24.  She reported that she has continued to do \"pretty good\" over the past several weeks.  She reported that she still \"loves\" her job with [employer], though there are still several stressors she has had to deal with.  She described many of these stressors and how she has been coping with them.  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  She stated that there is talk that they want her to eventually take a  position, but she is no longer sure if she really wants to take that on as she enjoys what she is already doing.  She reported that she already has a tendency to do too much at her job.  Deisi also reported that she and her  are celebrating their 11th wedding anniversary and she is taking off 4 days this weekend for them to celebrate in [place], [place], about which she is very excited.  Deisi reported that the only somewhat negative over the past several weeks was finding out that her PCP, Dr. Luu, is leaving the practice.  She stated that this has been a sad loss for her, but she is

## 2025-03-09 SDOH — HEALTH STABILITY: PHYSICAL HEALTH: ON AVERAGE, HOW MANY DAYS PER WEEK DO YOU ENGAGE IN MODERATE TO STRENUOUS EXERCISE (LIKE A BRISK WALK)?: 3 DAYS

## 2025-03-09 SDOH — HEALTH STABILITY: PHYSICAL HEALTH: ON AVERAGE, HOW MANY MINUTES DO YOU ENGAGE IN EXERCISE AT THIS LEVEL?: 30 MIN

## 2025-03-10 ASSESSMENT — ENCOUNTER SYMPTOMS
DIARRHEA: 0
CONSTIPATION: 0
SHORTNESS OF BREATH: 0
COLOR CHANGE: 0
CHEST TIGHTNESS: 0
ABDOMINAL PAIN: 0

## 2025-03-11 ENCOUNTER — OFFICE VISIT (OUTPATIENT)
Dept: FAMILY MEDICINE CLINIC | Age: 40
End: 2025-03-11

## 2025-03-11 VITALS
WEIGHT: 262 LBS | BODY MASS INDEX: 39.71 KG/M2 | HEART RATE: 84 BPM | HEIGHT: 68 IN | SYSTOLIC BLOOD PRESSURE: 134 MMHG | DIASTOLIC BLOOD PRESSURE: 89 MMHG

## 2025-03-11 DIAGNOSIS — R73.01 IFG (IMPAIRED FASTING GLUCOSE): ICD-10-CM

## 2025-03-11 DIAGNOSIS — E78.5 DYSLIPIDEMIA: ICD-10-CM

## 2025-03-11 DIAGNOSIS — Z76.0 MEDICATION REFILL: ICD-10-CM

## 2025-03-11 DIAGNOSIS — Z12.31 ENCOUNTER FOR SCREENING MAMMOGRAM FOR MALIGNANT NEOPLASM OF BREAST: ICD-10-CM

## 2025-03-11 DIAGNOSIS — Z76.89 ENCOUNTER TO ESTABLISH CARE: Primary | ICD-10-CM

## 2025-03-11 DIAGNOSIS — S06.9XAD TRAUMATIC BRAIN INJURY, WITH UNKNOWN LOSS OF CONSCIOUSNESS STATUS, SUBSEQUENT ENCOUNTER: ICD-10-CM

## 2025-03-11 DIAGNOSIS — Z11.59 NEED FOR HEPATITIS C SCREENING TEST: ICD-10-CM

## 2025-03-11 DIAGNOSIS — Z13.6 SCREENING FOR CARDIOVASCULAR CONDITION: ICD-10-CM

## 2025-03-11 DIAGNOSIS — Z51.81 MEDICATION MONITORING ENCOUNTER: ICD-10-CM

## 2025-03-11 PROBLEM — S06.9XAA TRAUMATIC BRAIN INJURY (HCC): Status: RESOLVED | Noted: 2022-12-12 | Resolved: 2025-03-11

## 2025-03-11 PROBLEM — R51.9 CHRONIC DAILY HEADACHE: Status: ACTIVE | Noted: 2024-02-15

## 2025-03-11 RX ORDER — TOPIRAMATE 100 MG/1
CAPSULE, EXTENDED RELEASE ORAL
COMMUNITY
Start: 2025-02-28

## 2025-03-11 RX ORDER — BACLOFEN 5 MG/1
5 TABLET ORAL NIGHTLY PRN
Qty: 90 TABLET | Refills: 2 | Status: SHIPPED | OUTPATIENT
Start: 2025-03-11

## 2025-03-11 RX ORDER — OMEPRAZOLE 40 MG/1
CAPSULE, DELAYED RELEASE ORAL
COMMUNITY
Start: 2024-11-28

## 2025-03-11 RX ORDER — CALCIUM CARBONATE 300MG(750)
TABLET,CHEWABLE ORAL
COMMUNITY

## 2025-03-11 SDOH — ECONOMIC STABILITY: FOOD INSECURITY: WITHIN THE PAST 12 MONTHS, YOU WORRIED THAT YOUR FOOD WOULD RUN OUT BEFORE YOU GOT MONEY TO BUY MORE.: NEVER TRUE

## 2025-03-11 SDOH — ECONOMIC STABILITY: FOOD INSECURITY: WITHIN THE PAST 12 MONTHS, THE FOOD YOU BOUGHT JUST DIDN'T LAST AND YOU DIDN'T HAVE MONEY TO GET MORE.: NEVER TRUE

## 2025-03-11 ASSESSMENT — PATIENT HEALTH QUESTIONNAIRE - PHQ9
SUM OF ALL RESPONSES TO PHQ QUESTIONS 1-9: 0
1. LITTLE INTEREST OR PLEASURE IN DOING THINGS: NOT AT ALL
SUM OF ALL RESPONSES TO PHQ QUESTIONS 1-9: 0
2. FEELING DOWN, DEPRESSED OR HOPELESS: NOT AT ALL
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
## 2025-03-12 ENCOUNTER — HOSPITAL ENCOUNTER (OUTPATIENT)
Facility: CLINIC | Age: 40
Discharge: HOME | End: 2025-03-12
Payer: COMMERCIAL

## 2025-03-12 DIAGNOSIS — Z51.81 MEDICATION MONITORING ENCOUNTER: ICD-10-CM

## 2025-03-12 DIAGNOSIS — Z13.6 SCREENING FOR CARDIOVASCULAR CONDITION: ICD-10-CM

## 2025-03-12 DIAGNOSIS — E78.5 DYSLIPIDEMIA: ICD-10-CM

## 2025-03-12 DIAGNOSIS — Z11.59 NEED FOR HEPATITIS C SCREENING TEST: ICD-10-CM

## 2025-03-12 DIAGNOSIS — R73.01 IFG (IMPAIRED FASTING GLUCOSE): ICD-10-CM

## 2025-03-12 DIAGNOSIS — Z76.89 ENCOUNTER TO ESTABLISH CARE: ICD-10-CM

## 2025-03-12 LAB
CHOLEST SERPL-MCNC: 210 MG/DL (ref 0–199)
CHOLESTEROL/HDL RATIO: 4.1
ERYTHROCYTE [DISTWIDTH] IN BLOOD BY AUTOMATED COUNT: 13.2 % (ref 11.8–14.4)
HCT VFR BLD AUTO: 40.6 % (ref 36.3–47.1)
HCV AB SERPL QL IA: NONREACTIVE
HDLC SERPL-MCNC: 51 MG/DL
HGB BLD-MCNC: 12.9 G/DL (ref 11.9–15.1)
LDLC SERPL CALC-MCNC: 144 MG/DL (ref 0–100)
MCH RBC QN AUTO: 29.1 PG (ref 25.2–33.5)
MCHC RBC AUTO-ENTMCNC: 31.8 G/DL (ref 28.4–34.8)
MCV RBC AUTO: 91.6 FL (ref 82.6–102.9)
NRBC BLD-RTO: 0 PER 100 WBC
PLATELET # BLD AUTO: 243 K/UL (ref 138–453)
PMV BLD AUTO: 11.6 FL (ref 8.1–13.5)
RBC # BLD AUTO: 4.43 M/UL (ref 3.95–5.11)
TRIGL SERPL-MCNC: 73 MG/DL
TSH SERPL DL<=0.05 MIU/L-ACNC: 2.48 UIU/ML (ref 0.27–4.2)
VLDLC SERPL CALC-MCNC: 15 MG/DL (ref 1–30)
WBC OTHER # BLD: 6.5 K/UL (ref 3.5–11.3)

## 2025-03-12 PROCEDURE — 85027 COMPLETE CBC AUTOMATED: CPT

## 2025-03-12 PROCEDURE — 36415 COLL VENOUS BLD VENIPUNCTURE: CPT

## 2025-03-12 PROCEDURE — 84443 ASSAY THYROID STIM HORMONE: CPT

## 2025-03-12 PROCEDURE — 80061 LIPID PANEL: CPT

## 2025-03-12 PROCEDURE — 86803 HEPATITIS C AB TEST: CPT

## 2025-03-13 ENCOUNTER — RESULTS FOLLOW-UP (OUTPATIENT)
Dept: FAMILY MEDICINE CLINIC | Age: 40
End: 2025-03-13

## 2025-03-16 PROBLEM — E78.5 DYSLIPIDEMIA: Status: ACTIVE | Noted: 2025-03-16

## 2025-05-19 ENCOUNTER — PATIENT MESSAGE (OUTPATIENT)
Dept: FAMILY MEDICINE CLINIC | Age: 40
End: 2025-05-19

## 2025-05-19 DIAGNOSIS — Z30.431 INTRAUTERINE DEVICE SURVEILLANCE: Primary | ICD-10-CM

## 2025-07-31 ENCOUNTER — OFFICE VISIT (OUTPATIENT)
Dept: OBGYN CLINIC | Age: 40
End: 2025-07-31
Payer: COMMERCIAL

## 2025-07-31 ENCOUNTER — HOSPITAL ENCOUNTER (OUTPATIENT)
Age: 40
Setting detail: SPECIMEN
Discharge: HOME | End: 2025-07-31

## 2025-07-31 VITALS
BODY MASS INDEX: 43.32 KG/M2 | HEIGHT: 65 IN | SYSTOLIC BLOOD PRESSURE: 138 MMHG | WEIGHT: 260 LBS | DIASTOLIC BLOOD PRESSURE: 90 MMHG | HEART RATE: 74 BPM

## 2025-07-31 DIAGNOSIS — Z01.419 ENCOUNTER FOR GYNECOLOGICAL EXAMINATION: Primary | ICD-10-CM

## 2025-07-31 DIAGNOSIS — Z97.5 IUD (INTRAUTERINE DEVICE) IN PLACE: ICD-10-CM

## 2025-07-31 DIAGNOSIS — Z12.31 ENCOUNTER FOR SCREENING MAMMOGRAM FOR BREAST CANCER: ICD-10-CM

## 2025-07-31 PROCEDURE — 99386 PREV VISIT NEW AGE 40-64: CPT | Performed by: NURSE PRACTITIONER

## 2025-07-31 ASSESSMENT — ENCOUNTER SYMPTOMS
ABDOMINAL DISTENTION: 0
GASTROINTESTINAL NEGATIVE: 1
BACK PAIN: 1
RESPIRATORY NEGATIVE: 1
SHORTNESS OF BREATH: 0
COUGH: 0
ALLERGIC/IMMUNOLOGIC NEGATIVE: 1

## 2025-07-31 NOTE — PROGRESS NOTES
[facility]  [address]  [address]  [place] [address]  Dept: [phone]      7/31/25    Deisi Mora     Gyn Annual Exam      CHIEF COMPLAINT:    Chief Complaint   Patient presents with    New Patient     Kyleena was inserted in 2018               Blood pressure (!) 138/90, pulse 74, height 1.651 m (5' 5\"), weight 117.9 kg (260 lb).           HPI :     Deisi Mora 1985 is a 39 y.o. No obstetric history on file.  who is here for her annual exam.   She has an IUD (kyleena) that was inserted 4/27/2028.  She gets occasional spotting  Last pap 1/3/2022 NILM  Hx chronic pain and headaches, hx TBI  Hx anxiety and depression      _____________________________________________________________________  Past Medical History:   Diagnosis Date    Anxiety     I really notice my anxiety flair ups when I'm driving.    Headache 2000    Intention tremor     Migraine 2000    Sleep difficulties 2018    TBI (traumatic brain injury) ([facility])     Traumatic brain injury ([facility]) 12/12/2022    Tremor 3-30-00                                                                   Past Surgical History:   Procedure Laterality Date    CARPAL TUNNEL RELEASE      EYE SURGERY      EYE SURGERY      INGUINAL HERNIA REPAIR      TONSILLECTOMY      URETER STENT PLACEMENT       Family History   Problem Relation Age of Onset    Diabetes type 2  Maternal Grandmother     Diabetes Maternal Grandmother     Allergy (Severe) Brother     Allergy (Severe) Brother      Social History     Tobacco Use   Smoking Status Never   Smokeless Tobacco Never     Social History     Substance and Sexual Activity   Alcohol Use Yes    Alcohol/week: 1.0 standard drink of alcohol    Types: 1 Cans of beer per week     Current Outpatient Medications   Medication Sig Dispense Refill    Multiple Vitamin (MULTIVITAMIN ADULT PO) Take by mouth      Magnesium 400 MG TABS Take by mouth      TROKENDI  Cosentyx Counseling:  I discussed with the patient the risks of Cosentyx including but not limited to worsening of Crohn's disease, immunosuppression, allergic reactions and infections.  The patient understands that monitoring is required including a PPD at baseline and must alert us or the primary physician if symptoms of infection or other concerning signs are noted.

## 2025-08-01 LAB
HPV I/H RISK 4 DNA CVX QL NAA+PROBE: NORMAL
HPV SAMPLE: NORMAL
HPV, INTERPRETATION: NORMAL
HPV16 DNA CVX QL NAA+PROBE: NORMAL
HPV18 DNA CVX QL NAA+PROBE: NORMAL
SPECIMEN DESCRIPTION: NORMAL

## 2025-08-05 LAB
HPV I/H RISK 4 DNA CVX QL NAA+PROBE: NOT DETECTED
HPV SAMPLE: NORMAL
HPV, INTERPRETATION: NORMAL
HPV16 DNA CVX QL NAA+PROBE: NOT DETECTED
HPV18 DNA CVX QL NAA+PROBE: NOT DETECTED
SPECIMEN DESCRIPTION: NORMAL

## 2025-08-15 LAB — CYTOLOGY REPORT: NORMAL
